# Patient Record
Sex: MALE | Race: WHITE | Employment: OTHER | URBAN - METROPOLITAN AREA
[De-identification: names, ages, dates, MRNs, and addresses within clinical notes are randomized per-mention and may not be internally consistent; named-entity substitution may affect disease eponyms.]

---

## 2019-08-11 ENCOUNTER — APPOINTMENT (OUTPATIENT)
Dept: CT IMAGING | Age: 74
DRG: 065 | End: 2019-08-11
Payer: MEDICARE

## 2019-08-11 ENCOUNTER — APPOINTMENT (OUTPATIENT)
Dept: GENERAL RADIOLOGY | Age: 74
DRG: 065 | End: 2019-08-11
Payer: MEDICARE

## 2019-08-11 ENCOUNTER — APPOINTMENT (OUTPATIENT)
Dept: MRI IMAGING | Age: 74
DRG: 065 | End: 2019-08-11
Payer: MEDICARE

## 2019-08-11 ENCOUNTER — HOSPITAL ENCOUNTER (INPATIENT)
Age: 74
LOS: 2 days | Discharge: HOME | DRG: 065 | End: 2019-08-13
Attending: EMERGENCY MEDICINE | Admitting: PSYCHIATRY & NEUROLOGY
Payer: MEDICARE

## 2019-08-11 DIAGNOSIS — I63.9 CEREBROVASCULAR ACCIDENT (CVA), UNSPECIFIED MECHANISM (HCC): Primary | ICD-10-CM

## 2019-08-11 PROBLEM — R29.90 STROKE-LIKE SYMPTOMS: Status: ACTIVE | Noted: 2019-08-11

## 2019-08-11 LAB
ABSOLUTE EOS #: 0.09 K/UL (ref 0–0.4)
ABSOLUTE IMMATURE GRANULOCYTE: NORMAL K/UL (ref 0–0.3)
ABSOLUTE LYMPH #: 1.07 K/UL (ref 1–4.8)
ABSOLUTE MONO #: 0.35 K/UL (ref 0.1–1.2)
ANION GAP SERPL CALCULATED.3IONS-SCNC: 8 MMOL/L (ref 9–17)
BASOPHILS # BLD: 0 % (ref 0–2)
BASOPHILS ABSOLUTE: 0.01 K/UL (ref 0–0.2)
BUN BLDV-MCNC: 30 MG/DL (ref 8–23)
BUN/CREAT BLD: ABNORMAL (ref 9–20)
CALCIUM SERPL-MCNC: 9.8 MG/DL (ref 8.6–10.4)
CHLORIDE BLD-SCNC: 104 MMOL/L (ref 98–107)
CO2: 28 MMOL/L (ref 20–31)
CREAT SERPL-MCNC: 1.09 MG/DL (ref 0.7–1.2)
DIFFERENTIAL TYPE: NORMAL
EOSINOPHILS RELATIVE PERCENT: 2 % (ref 1–4)
GFR AFRICAN AMERICAN: >60 ML/MIN
GFR NON-AFRICAN AMERICAN: >60 ML/MIN
GFR SERPL CREATININE-BSD FRML MDRD: ABNORMAL ML/MIN/{1.73_M2}
GFR SERPL CREATININE-BSD FRML MDRD: ABNORMAL ML/MIN/{1.73_M2}
GLUCOSE BLD-MCNC: 221 MG/DL (ref 70–99)
HCT VFR BLD CALC: 45.3 % (ref 41–53)
HEMOGLOBIN: 16.2 G/DL (ref 13.5–17.5)
IMMATURE GRANULOCYTES: NORMAL %
INR BLD: 1.1
LYMPHOCYTES # BLD: 26 % (ref 24–44)
MAGNESIUM: 2.2 MG/DL (ref 1.6–2.6)
MCH RBC QN AUTO: 30.2 PG (ref 26–34)
MCHC RBC AUTO-ENTMCNC: 35.8 G/DL (ref 31–37)
MCV RBC AUTO: 84.4 FL (ref 80–100)
MONOCYTES # BLD: 9 % (ref 2–11)
NRBC AUTOMATED: NORMAL PER 100 WBC
PARTIAL THROMBOPLASTIN TIME: 27.1 SEC (ref 21.3–31.3)
PDW BLD-RTO: 12.7 % (ref 12.5–15.4)
PLATELET # BLD: 142 K/UL (ref 140–450)
PLATELET ESTIMATE: NORMAL
PMV BLD AUTO: 9.4 FL (ref 8–14)
POTASSIUM SERPL-SCNC: 4.3 MMOL/L (ref 3.7–5.3)
PROTHROMBIN TIME: 11.6 SEC (ref 9.4–12.6)
RBC # BLD: 5.37 M/UL (ref 4.5–5.9)
RBC # BLD: NORMAL 10*6/UL
SEG NEUTROPHILS: 63 % (ref 36–66)
SEGMENTED NEUTROPHILS ABSOLUTE COUNT: 2.6 K/UL (ref 1.8–7.7)
SODIUM BLD-SCNC: 140 MMOL/L (ref 135–144)
TROPONIN INTERP: NORMAL
TROPONIN T: NORMAL NG/ML
TROPONIN, HIGH SENSITIVITY: 6 NG/L (ref 0–22)
WBC # BLD: 4.1 K/UL (ref 3.5–11)
WBC # BLD: NORMAL 10*3/UL

## 2019-08-11 PROCEDURE — 99449 NTRPROF PH1/NTRNET/EHR 31/>: CPT | Performed by: PSYCHIATRY & NEUROLOGY

## 2019-08-11 PROCEDURE — 6360000004 HC RX CONTRAST MEDICATION: Performed by: EMERGENCY MEDICINE

## 2019-08-11 PROCEDURE — 84484 ASSAY OF TROPONIN QUANT: CPT

## 2019-08-11 PROCEDURE — 85610 PROTHROMBIN TIME: CPT

## 2019-08-11 PROCEDURE — 83735 ASSAY OF MAGNESIUM: CPT

## 2019-08-11 PROCEDURE — 70551 MRI BRAIN STEM W/O DYE: CPT

## 2019-08-11 PROCEDURE — 71045 X-RAY EXAM CHEST 1 VIEW: CPT

## 2019-08-11 PROCEDURE — 99285 EMERGENCY DEPT VISIT HI MDM: CPT

## 2019-08-11 PROCEDURE — 80048 BASIC METABOLIC PNL TOTAL CA: CPT

## 2019-08-11 PROCEDURE — 36415 COLL VENOUS BLD VENIPUNCTURE: CPT

## 2019-08-11 PROCEDURE — 85025 COMPLETE CBC W/AUTO DIFF WBC: CPT

## 2019-08-11 PROCEDURE — 93005 ELECTROCARDIOGRAM TRACING: CPT | Performed by: EMERGENCY MEDICINE

## 2019-08-11 PROCEDURE — 6370000000 HC RX 637 (ALT 250 FOR IP): Performed by: EMERGENCY MEDICINE

## 2019-08-11 PROCEDURE — 2580000003 HC RX 258: Performed by: EMERGENCY MEDICINE

## 2019-08-11 PROCEDURE — 85730 THROMBOPLASTIN TIME PARTIAL: CPT

## 2019-08-11 PROCEDURE — 70450 CT HEAD/BRAIN W/O DYE: CPT

## 2019-08-11 PROCEDURE — 70498 CT ANGIOGRAPHY NECK: CPT

## 2019-08-11 PROCEDURE — 99223 1ST HOSP IP/OBS HIGH 75: CPT | Performed by: PSYCHIATRY & NEUROLOGY

## 2019-08-11 PROCEDURE — 2060000000 HC ICU INTERMEDIATE R&B

## 2019-08-11 RX ORDER — 0.9 % SODIUM CHLORIDE 0.9 %
80 INTRAVENOUS SOLUTION INTRAVENOUS ONCE
Status: COMPLETED | OUTPATIENT
Start: 2019-08-11 | End: 2019-08-11

## 2019-08-11 RX ORDER — 0.9 % SODIUM CHLORIDE 0.9 %
1000 INTRAVENOUS SOLUTION INTRAVENOUS ONCE
Status: COMPLETED | OUTPATIENT
Start: 2019-08-11 | End: 2019-08-11

## 2019-08-11 RX ORDER — LABETALOL 20 MG/4 ML (5 MG/ML) INTRAVENOUS SYRINGE
10 EVERY 4 HOURS PRN
Status: DISCONTINUED | OUTPATIENT
Start: 2019-08-11 | End: 2019-08-13 | Stop reason: HOSPADM

## 2019-08-11 RX ORDER — SODIUM CHLORIDE 0.9 % (FLUSH) 0.9 %
10 SYRINGE (ML) INJECTION EVERY 12 HOURS SCHEDULED
Status: DISCONTINUED | OUTPATIENT
Start: 2019-08-11 | End: 2019-08-13 | Stop reason: HOSPADM

## 2019-08-11 RX ORDER — ONDANSETRON 2 MG/ML
4 INJECTION INTRAMUSCULAR; INTRAVENOUS EVERY 6 HOURS PRN
Status: DISCONTINUED | OUTPATIENT
Start: 2019-08-11 | End: 2019-08-13 | Stop reason: HOSPADM

## 2019-08-11 RX ORDER — ASPIRIN 300 MG/1
300 SUPPOSITORY RECTAL DAILY
Status: DISCONTINUED | OUTPATIENT
Start: 2019-08-12 | End: 2019-08-13 | Stop reason: HOSPADM

## 2019-08-11 RX ORDER — SODIUM CHLORIDE 0.9 % (FLUSH) 0.9 %
10 SYRINGE (ML) INJECTION PRN
Status: DISCONTINUED | OUTPATIENT
Start: 2019-08-11 | End: 2019-08-13 | Stop reason: HOSPADM

## 2019-08-11 RX ORDER — ATORVASTATIN CALCIUM 80 MG/1
80 TABLET, FILM COATED ORAL NIGHTLY
Status: DISCONTINUED | OUTPATIENT
Start: 2019-08-11 | End: 2019-08-12

## 2019-08-11 RX ORDER — SODIUM CHLORIDE 0.9 % (FLUSH) 0.9 %
10 SYRINGE (ML) INJECTION PRN
Status: DISCONTINUED | OUTPATIENT
Start: 2019-08-11 | End: 2019-08-11 | Stop reason: SDUPTHER

## 2019-08-11 RX ORDER — ASPIRIN 81 MG/1
324 TABLET, CHEWABLE ORAL ONCE
Status: COMPLETED | OUTPATIENT
Start: 2019-08-11 | End: 2019-08-11

## 2019-08-11 RX ORDER — ASPIRIN 81 MG/1
81 TABLET ORAL DAILY
Status: DISCONTINUED | OUTPATIENT
Start: 2019-08-12 | End: 2019-08-13 | Stop reason: HOSPADM

## 2019-08-11 RX ORDER — CLOPIDOGREL BISULFATE 75 MG/1
300 TABLET ORAL ONCE
Status: COMPLETED | OUTPATIENT
Start: 2019-08-11 | End: 2019-08-11

## 2019-08-11 RX ORDER — SODIUM CHLORIDE 9 MG/ML
INJECTION, SOLUTION INTRAVENOUS CONTINUOUS
Status: DISCONTINUED | OUTPATIENT
Start: 2019-08-11 | End: 2019-08-13 | Stop reason: HOSPADM

## 2019-08-11 RX ORDER — CLOPIDOGREL BISULFATE 75 MG/1
75 TABLET ORAL DAILY
Status: DISCONTINUED | OUTPATIENT
Start: 2019-08-12 | End: 2019-08-13 | Stop reason: HOSPADM

## 2019-08-11 RX ADMIN — Medication 10 ML: at 12:16

## 2019-08-11 RX ADMIN — ASPIRIN 81 MG 324 MG: 81 TABLET ORAL at 12:34

## 2019-08-11 RX ADMIN — SODIUM CHLORIDE 80 ML: 9 INJECTION, SOLUTION INTRAVENOUS at 12:16

## 2019-08-11 RX ADMIN — CLOPIDOGREL BISULFATE 300 MG: 75 TABLET ORAL at 12:38

## 2019-08-11 RX ADMIN — SODIUM CHLORIDE 1000 ML: 9 INJECTION, SOLUTION INTRAVENOUS at 13:52

## 2019-08-11 RX ADMIN — IOVERSOL 75 ML: 741 INJECTION INTRA-ARTERIAL; INTRAVENOUS at 12:16

## 2019-08-11 SDOH — HEALTH STABILITY: MENTAL HEALTH: HOW OFTEN DO YOU HAVE A DRINK CONTAINING ALCOHOL?: NEVER

## 2019-08-11 ASSESSMENT — ENCOUNTER SYMPTOMS
WHEEZING: 0
CONSTIPATION: 0
SINUS PRESSURE: 0
STRIDOR: 0
NAUSEA: 0
BACK PAIN: 0
RHINORRHEA: 0
SHORTNESS OF BREATH: 0
TROUBLE SWALLOWING: 0
PHOTOPHOBIA: 0
VOICE CHANGE: 0
EYE REDNESS: 0

## 2019-08-11 ASSESSMENT — PAIN SCALES - GENERAL: PAINLEVEL_OUTOF10: 0

## 2019-08-11 NOTE — ED PROVIDER NOTES
[address] [facility] ED  [address] [facility]. [facility] [address]  Phone: [phone]  Fax: [phone]        Pt Name: Sudha Smiley  MRN: [id number]  [place] 1945  Date of evaluation: 8/11/19      CHIEF COMPLAINT     Chief Complaint   Patient presents with    Numbness     Right side of body is numb. Some extremity weakness as well. Last known well was 1130am today         HISTORY OF PRESENT ILLNESS  (Location/Symptom, Timing/Onset, Context/Setting, Quality, Duration, Modifying Factors, Severity.)    Sudha Smiley is a 69 y.o. male who presents with right sided numbness difficulty with coordination of the right side of the body and slurred speech. The patient states that he was pushing a boat when he suddenly started noticing some numbness to the right side of his body associated with some difficulty with coordination of his right arm right leg and is noted some difficulty with his speech he denies any headache no chest pain or shortness of breath denies any similar symptoms the incident occurred approximately 20 minutes prior to arrival nothing makes his symptoms better or worse      REVIEW OF SYSTEMS    (2-9 systems for level 4, 10 or more for level 5)     Review of Systems   Neurological: Positive for weakness and numbness. All other systems reviewed and are negative. PAST MEDICAL HISTORY    has no past medical history on file. SURGICAL HISTORY      has a past surgical history that includes Cholecystectomy and hernia repair. CURRENTMEDICATIONS       Previous Medications    No medications on file       ALLERGIES     is allergic to pcn [penicillins] and sulfa antibiotics. FAMILY HISTORY     He indicated that the status of his mother is unknown. He indicated that the status of his father is unknown.     family history includes Cancer in his father; High Blood Pressure in his mother; Stroke in his mother. SOCIAL HISTORY      reports that he has never smoked.  He does not Gaze:    Only horizontal eye movements will be tested. Voluntary or reflexive (oculocephalic) eye movements will be scored, but caloric testing is not done. If the patient has a conjugate deviation of the eyes that can be overcome by voluntary or reflexive activity, the score will be 1. If a patient has an isolated peripheral nerve paresis (CN III, IV or VI), score a 1. Gaze is testable in all aphasic patients. Patients with ocular trauma, bandages, pre-existing blindness, or other disorder of visual acuity or fields should be tested with reflexive movements, and a choice made by the investigator. Establishing eye contact and then moving about the patient from side to side will occasionally clarify the presence of a partial gaze palsy. 0 = Normal.   1 = Partial gaze palsy; gaze is abnormal in one or both eyes, but forced deviation or total gaze paresis is not present. 2 = Forced deviation, or total gaze paresis not overcome by the oculocephalic maneuver. 3.         0  Visual:  Visual fields (upper and lower quadrants) are tested by confrontation, using finger counting or visual threat, as appropriate. Patients may be encouraged, but if they look at the side of the moving fingers appropriately, this can be scored as normal. If there is unilateral blindness or enucleation, visual fields in the remaining eye are scored. Score 1 only if a clear-cut asymmetry, including quadrantanopia, is found. If patient is blind from any cause, score 3. Double simultaneous stimulation is performed at this point. If there is extinction, patient receives a 1, and the results are used to respond to item 11.     0 = No visual loss. 1 = Partial hemianopia. 2 = Complete hemianopia. 3 = Bilateral hemianopia (blind including cortical blindness). 4.       0 Facial Palsy:  Ask - or use pantomime to encourage - the patient to show teeth or raise eyebrows and close eyes.  Score symmetry of grimace in response to noxious

## 2019-08-11 NOTE — CONSULTS
smokeless tobacco history on file. He reports that he does not drink alcohol or use drugs. Family History   Problem Relation Age of Onset    Stroke Mother     High Blood Pressure Mother     Cancer Father        Current Medications:     sodium chloride flush  10 mL Intravenous 2 times per day    [START ON 8/12/2019] aspirin  81 mg Oral Daily    Or    [START ON 8/12/2019] aspirin  300 mg Rectal Daily    atorvastatin  80 mg Oral Nightly    [START ON 8/12/2019] clopidogrel  75 mg Oral Daily     PRN Meds include: sodium chloride flush, magnesium hydroxide, ondansetron, labetalol    ROS:   Constitutional Negative for fever and chills   HEENT Negative for ear discharge, ear pain, nosebleed   Eyes Negative for photophobia, pain and discharge   Respiratory Negative for hemoptysis and sputum   Cardiovascular Negative for orthopnea, claudication and PND   Gastrointestinal Negative for abdominal pain, diarrhea, blood in stool   Musculoskeletal Negative for joint pain, negative for myalgia   Skin Negative for rash or itching   Endo/heme/allergies Negative for polydipsia, environmental allergy   Psychiatric Negative for suicidal ideation. Patient is not anxious           Objective:   BP (!) 147/86   Pulse 67   Temp 98.2 °F (36.8 °C)   Resp 16   Ht 5' 6\" (1.676 m)   Wt 150 lb (68 kg)   SpO2 97%   BMI 24.21 kg/m²     Blood pressure range: Systolic (03SGN), NDA:578 , Min:131 , XNR:266   ; Diastolic (56YVC), HNO:24, Min:77, Max:91    On examination: Alert awake oriented x3. Speech fluent and coherent. On cranial nerve examination PERRLA and EOMI. Fundi reveal intact venous pulsations. Facial sensation reveals diminished light touch and pinprick in right face. Face appears symmetric. Tongue protrudes midline. Palate elevates symmetrically.   On motor examination he has subtle weakness of grade 5- in distal muscle groups of right upper and right lower extremities; otherwise 5/5 in left upper and left lower

## 2019-08-11 NOTE — ED NOTES
Dr Daniel Juarez completed telemed with patient and family. They are electing to have TPA given. Dr. Daniel Juarez advised Dr. Carolina Cloud of same. Dr. Carolina Cloud requests change to [facility] for transfer. Dr. Daniel Juarez requests patient to Neuro ICU direct if available or ED to ED if no ICU available.       Mando Sun RN  08/11/19 5653

## 2019-08-11 NOTE — ED PROVIDER NOTES
pulse is 68. His respiration is 18 and oxygen saturation is 97%.    69 y.o. male no acute distress, cardiac exam regular rate and rhythm no murmurs rubs gallops, pulmonary clear bilaterally abdomen soft nontender nondistended. Strength is 5 out of 5 in all 4 extremity's, there is mild pronator drift and mild decreased sensation on the right compared with the left, there is mild tongue deviation towards the right. No facial droop, pupils equal reactive bilaterally with no extraocular motion abnormality    Impression: CVA    Plan: Stroke alert based on timeframe, will need MRI and admission to neurology service      CRITICAL CARE: There was a high probability of clinically significant/life threatening deterioration in this patient's condition which required my urgent intervention. Total critical care time was 15 minutes. This excludes any time for separately reportable procedures.      Ivana Arcos MD  Attending Emergency Physician        Jonny Gray MD  08/11/19 8005       Jonny Gray MD  08/13/19 1013

## 2019-08-11 NOTE — CONSULTS
Endovascular Neurosurgery Note  Stroke Alert paged with ETA 1226  ER Room # 13  Present at beside on patient arrival ~1227  8/11/2019 2:41 PM    Pt Name: Carmen Dc  MRN: [id number]  YOB: 1945  Date of evaluation: 8/11/2019  Primary Care Physician: Adele Sheriff is a 69 y.o. male with no significant medical history who presented to [facility] ED as a transfer from [facility] due to concern for acute ischemic stroke. Onset of symptoms around 1130AM.  Patient states he has a boat in his driveway that he wanted to work on. He was trying to move it by himself earlier this morning when symptoms started. Reports acute onset of right sided numbness and weakness and difficulty getting his words out. [facility] NIH was reportedly 3. CT Head negative. CTA Head/Neck unremarkable. Risks and benefits of tPA were discussed and patient stated he did not want the medication. He was then loaded with Plavix 300mg and given ASA 324mg and transferred to [facility] ED. On arrival, NIH 4. Patient has very slight intermittent dysarthria and slight loss of fluency of speech. He feels that this has improved significantly. Also admits to right hemibody numbness, slight right upper extremity drift. Patient feels like he was educated on the risks and benefits of tPA. He feels like his symptoms have improved significantly and would still like to forgo tPA administration. Allergies  is allergic to pcn [penicillins] and sulfa antibiotics. Medications  Prior to Admission medications    Not on File    Scheduled Meds:   sodium chloride  1,000 mL Intravenous Once     Continuous Infusions:  PRN Meds:.sodium chloride flush    Past Medical History   has no past medical history on file.     OBJECTIVE  BP (!) 157/78   Pulse 68   Temp 98.2 °F (36.8 °C) (Oral)   Resp 18   Ht 5' 6\" (1.676 m)   Wt 150 lb (68 kg)   SpO2 97%   BMI 24.21 kg/m² ROS  CONSTITUTIONAL: negative for fatigue and malaise   EYES: negative for double vision and photophobia    HEENT: negative for tinnitus and sore throat   RESPIRATORY: negative for cough, shortness of breath   CARDIOVASCULAR: negative for chest pain, palpitations   GASTROINTESTINAL: negative for nausea, vomiting   GENITOURINARY: negative for incontinence   MUSCULOSKELETAL: negative for neck or back pain   NEUROLOGICAL: Positive right sided numbness, slight slurred speech/aphasia.  negative for seizures   PSYCHIATRIC: negative for agitated     Review of systems otherwise negative. EXAM:    CONSTITUTIONAL:  Well developed, well nourished, alert and oriented x 3, in no acute distress. GCS 15. Nontoxic. Slight intermittent dysarthria. Slight aphasia/loss of fluency of speech. HEAD:  normocephalic, atraumatic    EYES:  PERRL, EOMI.   ENT:  moist mucous membranes   NECK:  supple, symmetric   LUNGS:  Equal air entry bilaterally,clear   CARDIOVASCULAR:  normal s1 / s2, RRR   ABDOMEN:  Soft, no rigidity   NEUROLOGIC:  Mental Status:  A & O x3,awake             Cranial Nerves:    cranial nerves II-XII are grossly intact except right facial and tongue numbness. Motor Exam:    Drift:  present - slight drift right upper extremity, does not hit bed  Tone:  normal    Motor exam is 5 out of 5 all extremities with the exception of 4+/5 right upper extremity, slight drift    Sensory:    Touch:    Right Upper Extremity:  abnormal - numbness  Left Upper Extremity:  normal  Right Lower Extremity:  abnormal - numbness  Left Lower Extremity:  normal    Coordination/Dysmetria:  Heel to Shin:  Right:  normal  Left:  normal  Finger to Nose:   Right:  normal  Left:  normal    SKIN:  no rash        INITIAL NIH STROKE SCALE    Time Performed:  1227PM    Administer stroke scale items in the order listed. Record performance in each category after each subscale exam. Do not go back and change scores.  Follow directions provided for each exam technique. Scores should reflect what the patient does, not what the clinician thinks the patient can do. The clinician should record answers while administering the exam and work quickly. Except where indicated, the patient should not be coached (i.e., repeated requests to patient to make a special effort). 1a.  Level of consciousness:  0 - alert; keenly responsive  1b. Level of consciousness questions:  0 - answers both questions correctly  1c. Level of consciousness questions:  0 - performs both tasks correctly  2. Best Gaze:  0 - normal  3. Visual:  0 - no visual loss  4. Facial Palsy:  0 - normal symmetric movement  5a. Motor left arm:  0 - no drift, limb holds 90 (or 45) degrees for full 10 seconds  5b. Motor right arm:  1 - drift, limb holds 90 (or 45) degrees but drifts down before full 10 seconds: does not hit bed  6a. Motor left le - no drift; leg holds 30 degree position for full 5 seconds  6b. Motor right le - no drift; leg holds 30 degree position for full 5 seconds  7. Limb Ataxia:  0 - absent  8. Sensory:  1 - mild to moderate sensory loss; patient feels pinprick is less sharp or is dull on the affected side; there is a loss of superficial pain with pinprick but patient is aware of being touched   9. Best Language:  1 - mild to moderate aphasia; some obvious loss of fluency or facility of comprehension without significant limitation on ideas expressed or form of expression. Reduction of speech and/or comprehension, however, makes conversation about provided materials difficult or impossible. For example, in conversation about provided materials, examiner can identify picture or naming card content from patient's response. 10.  Dysarthria:  1 - mild to moderate, patient slurs at least some words and at worst, can be understood with some difficulty  11.   Extinction and Inattention:  0 - no abnormality    TOTAL:  4    Imaging:  CT brain without

## 2019-08-11 NOTE — ED PROVIDER NOTES
time. He appears well-developed and well-nourished. HENT:   Head: Normocephalic and atraumatic. Nose: Nose normal.   Mouth/Throat: Oropharynx is clear and moist.   Eyes: Pupils are equal, round, and reactive to light. EOM are normal.   Neck: Normal range of motion. Neck supple. Cardiovascular: Normal rate, regular rhythm, normal heart sounds and intact distal pulses. Pulmonary/Chest: Breath sounds normal. No respiratory distress. He has no wheezes. He has no rales. Abdominal: Soft. Bowel sounds are normal. He exhibits no distension. There is no tenderness. Musculoskeletal: Normal range of motion. He exhibits no edema or deformity. Neurological: He is alert and oriented to person, place, and time. He has normal reflexes. Right lower extremity diminished sensation. Right upper extremity diminished sensation. Right upper extremity drift that does not hit the bed. No facial droop. Pupils equal round reactive. Patient with mild dysarthria. Skin: Skin is warm and dry. No rash noted. No erythema. Psychiatric: He has a normal mood and affect. His behavior is normal.       DIFFERENTIAL  DIAGNOSIS     PLAN (LABS / IMAGING / EKG):  Orders Placed This Encounter   Procedures    CT Head WO Contrast    CTA HEAD NECK W CONTRAST    XR CHEST PORTABLE    MRI BRAIN WO CONTRAST    Protime-INR    APTT    CBC Auto Differential    Basic Metabolic Panel    Troponin    Magnesium    Hemoglobin A1c    Lipid panel - fasting    CBC    Basic Metabolic Panel w/ Reflex to MG    DIET GENERAL;    Telemetry monitoring    Vital signs    Telemetry monitoring    Up as tolerated    Adv Diet as Sherin (nurse communication)    NIHSS/Neuro Checks    Swallow screen by nursing before diet and oral medications started.     Stroke education    Misc nursing order (specify)    Full Code    Inpatient consult to Stroke Team    Inpatient consult to PM&R - Physiatry    OT eval and treat    PT evaluation and treat    Initiate Oxygen Therapy Protocol    Speech Language Pathology (SLP) eval and treat    EKG 12 Lead    EKG REPORT    Echocardiogram complete 2D with doppler with color    Insert peripheral IV    Saline lock IV    PATIENT STATUS (FROM ED OR OR/PROCEDURAL) Inpatient    PATIENT STATUS (FROM ED OR OR/PROCEDURAL) Inpatient       MEDICATIONS ORDERED:  Orders Placed This Encounter   Medications    ioversol (OPTIRAY) 74 % injection 75 mL    0.9 % sodium chloride bolus    sodium chloride flush 0.9 % injection 10 mL    aspirin chewable tablet 324 mg    clopidogrel (PLAVIX) tablet 300 mg    0.9 % sodium chloride bolus    sodium chloride flush 0.9 % injection 10 mL    DISCONTD: sodium chloride flush 0.9 % injection 10 mL    magnesium hydroxide (MILK OF MAGNESIA) 400 MG/5ML suspension 30 mL    ondansetron (ZOFRAN) injection 4 mg    OR Linked Order Group     aspirin EC tablet 81 mg     aspirin suppository 300 mg    0.9 % sodium chloride infusion    DISCONTD: atorvastatin (LIPITOR) tablet 80 mg    clopidogrel (PLAVIX) tablet 75 mg    labetalol (NORMODYNE;TRANDATE) injection syringe 10 mg    rosuvastatin (CRESTOR) tablet 10 mg       DDX: CVA, MS, Guillain [place], Transverse myelitis, Myasthenia gravis, cardiac, anemia, electrolytes, infection, change in medications, hypothyroid, rheumatalgic, depression, dehydration    Evaluate for: orthostatic symptoms, conjunctiva pallor, PERRLA, EOMI, lung crackles, neuro exam, reflexes, guaiac stool      DIAGNOSTIC RESULTS / EMERGENCY DEPARTMENT COURSE / MDM     LABS:  Results for orders placed or performed during the hospital encounter of 08/11/19   Protime-INR   Result Value Ref Range    Protime 11.6 9.4 - 12.6 sec    INR 1.1    APTT   Result Value Ref Range    PTT 27.1 21.3 - 31.3 sec   CBC Auto Differential   Result Value Ref Range    WBC 4.1 3.5 - 11.0 k/uL    RBC 5.37 4.5 - 5.9 m/uL    Hemoglobin 16.2 13.5 - 17.5 g/dL    Hematocrit 45.3 41 - 53 %    MCV 84.4 80 - 44.6 40.7 - 50.3 %    MCV 86.3 82.6 - 102.9 fL    MCH 29.8 25.2 - 33.5 pg    MCHC 34.5 28.4 - 34.8 g/dL    RDW 12.1 11.8 - 14.4 %    Platelets 604 878 - 895 k/uL    MPV 9.1 8.1 - 13.5 fL    NRBC Automated 0.0 0.0 per 100 WBC   Basic Metabolic Panel w/ Reflex to MG   Result Value Ref Range    Glucose 134 (H) 70 - 99 mg/dL    BUN 23 8 - 23 mg/dL    CREATININE 0.86 0.70 - 1.20 mg/dL    Bun/Cre Ratio NOT REPORTED 9 - 20    Calcium 8.9 8.6 - 10.4 mg/dL    Sodium 140 135 - 144 mmol/L    Potassium 3.9 3.7 - 5.3 mmol/L    Chloride 104 98 - 107 mmol/L    CO2 25 20 - 31 mmol/L    Anion Gap 11 9 - 17 mmol/L    GFR Non-African American >60 >60 mL/min    GFR African American >60 >60 mL/min    GFR Comment          GFR Staging NOT REPORTED    EKG 12 Lead   Result Value Ref Range    Ventricular Rate 72 BPM    Atrial Rate 72 BPM    P-R Interval 148 ms    QRS Duration 80 ms    Q-T Interval 368 ms    QTc Calculation (Bazett) 402 ms    P Axis 52 degrees    R Axis -58 degrees    T Axis 46 degrees         RADIOLOGY:  None    EKG  None    All EKG's are interpreted by the Emergency Department Physician who either signs or Co-signs this chart in the absence of a cardiologist.    EMERGENCY DEPARTMENT COURSE:  Patient with a NIH of 4 on arrival.  Patient with mild improvement in symptoms. Explained risks and benefits of TPA and patient declining TPA. Neuro team at bedside evaluating patient. Patient will be admitted to the neuro ICU. Patient denying any pain. Patient awaiting bed. PROCEDURES:  None    CONSULTS:  IP CONSULT TO STROKE TEAM  IP CONSULT TO PHYSICAL MEDICINE REHAB    CRITICAL CARE:  None    FINAL IMPRESSION      1.  Cerebrovascular accident (CVA), unspecified mechanism ([facility] [address]          DISPOSITION / PLAN     DISPOSITION Admitted 08/11/2019 02:55:42 PM      PATIENT REFERRED TO:  [address] [facility], [address]  [address]  [phone]            DISCHARGE MEDICATIONS:  There are no discharge medications for this patient.       Brielle Hays MD  Emergency Medicine Resident    (Please note that portions of thisnote were completed with a voice recognition program.  Efforts were made to edit the dictations but occasionally words are mis-transcribed.)       Brielle Hays MD  08/12/19 8545

## 2019-08-11 NOTE — ED PROVIDER NOTES
Patient presented with numbness to outside facility within the window for TPA however declined it at the time patient is currently admitted to neuro for stroke evaluation      Sage George DO  08/11/19 [address] Barbara Maciel DO  08/11/19 0034

## 2019-08-11 NOTE — CONSULTS
at patient bedside (via interactive/real-time software) as patient is in imminent and life threatening deterioration without further treatment and evaluation.   Time spent examining patient, reviewing the images personally, reviewing the chart, perform high complexity decision making and speaking with the nursing staff regarding recommendations     Geri Solorio MD Pager: [phone]  Stroke, [facility]  [address] [address] [place]  Electronically signed 8/11/2019 at 1:58 PM

## 2019-08-11 NOTE — ED NOTES
Pt arrives to ER tx from [facility] er c/o right sided numbness sudden onset this morning appx 1130 LKW. Pt received 324 ASA and dose of plavix PTA at previous facility, denied TPA. Dr Ivy Hernandez on board and has spoken w/ pt. Pt transferred for stroke team consult. Pt arrives w/ mild numbness to entire right side, states symptoms have improved dramatically. Pt states he initially had more severe numbness along w/ expressive aphasia, the expressive aphasia has now resolved. Pt in NAD w/ rr even and unalbored, aox4, and denies pain on arrival. Stroke and ER teams @ bedside for eval. Pt on telemetry, call light within reach. Will continue to monitor.      Ailyn Dang RN  08/11/19 2612

## 2019-08-12 LAB
ANION GAP SERPL CALCULATED.3IONS-SCNC: 11 MMOL/L (ref 9–17)
BUN BLDV-MCNC: 23 MG/DL (ref 8–23)
BUN/CREAT BLD: ABNORMAL (ref 9–20)
CALCIUM SERPL-MCNC: 8.9 MG/DL (ref 8.6–10.4)
CHLORIDE BLD-SCNC: 104 MMOL/L (ref 98–107)
CHOLESTEROL/HDL RATIO: 4.3
CHOLESTEROL: 187 MG/DL
CO2: 25 MMOL/L (ref 20–31)
CREAT SERPL-MCNC: 0.86 MG/DL (ref 0.7–1.2)
EKG ATRIAL RATE: 72 BPM
EKG P AXIS: 52 DEGREES
EKG P-R INTERVAL: 148 MS
EKG Q-T INTERVAL: 368 MS
EKG QRS DURATION: 80 MS
EKG QTC CALCULATION (BAZETT): 402 MS
EKG R AXIS: -58 DEGREES
EKG T AXIS: 46 DEGREES
EKG VENTRICULAR RATE: 72 BPM
ESTIMATED AVERAGE GLUCOSE: 140 MG/DL
GFR AFRICAN AMERICAN: >60 ML/MIN
GFR NON-AFRICAN AMERICAN: >60 ML/MIN
GFR SERPL CREATININE-BSD FRML MDRD: ABNORMAL ML/MIN/{1.73_M2}
GFR SERPL CREATININE-BSD FRML MDRD: ABNORMAL ML/MIN/{1.73_M2}
GLUCOSE BLD-MCNC: 134 MG/DL (ref 70–99)
HBA1C MFR BLD: 6.5 % (ref 4–6)
HCT VFR BLD CALC: 44.6 % (ref 40.7–50.3)
HDLC SERPL-MCNC: 43 MG/DL
HEMOGLOBIN: 15.4 G/DL (ref 13–17)
LDL CHOLESTEROL: 123 MG/DL (ref 0–130)
LV EF: 53 %
LVEF MODALITY: NORMAL
MCH RBC QN AUTO: 29.8 PG (ref 25.2–33.5)
MCHC RBC AUTO-ENTMCNC: 34.5 G/DL (ref 28.4–34.8)
MCV RBC AUTO: 86.3 FL (ref 82.6–102.9)
NRBC AUTOMATED: 0 PER 100 WBC
PDW BLD-RTO: 12.1 % (ref 11.8–14.4)
PLATELET # BLD: 143 K/UL (ref 138–453)
PMV BLD AUTO: 9.1 FL (ref 8.1–13.5)
POTASSIUM SERPL-SCNC: 3.9 MMOL/L (ref 3.7–5.3)
RBC # BLD: 5.17 M/UL (ref 4.21–5.77)
SODIUM BLD-SCNC: 140 MMOL/L (ref 135–144)
TRIGL SERPL-MCNC: 107 MG/DL
VLDLC SERPL CALC-MCNC: NORMAL MG/DL (ref 1–30)
WBC # BLD: 5 K/UL (ref 3.5–11.3)

## 2019-08-12 PROCEDURE — 99232 SBSQ HOSP IP/OBS MODERATE 35: CPT | Performed by: PSYCHIATRY & NEUROLOGY

## 2019-08-12 PROCEDURE — 80061 LIPID PANEL: CPT

## 2019-08-12 PROCEDURE — 85027 COMPLETE CBC AUTOMATED: CPT

## 2019-08-12 PROCEDURE — 92523 SPEECH SOUND LANG COMPREHEN: CPT

## 2019-08-12 PROCEDURE — 83036 HEMOGLOBIN GLYCOSYLATED A1C: CPT

## 2019-08-12 PROCEDURE — 80048 BASIC METABOLIC PNL TOTAL CA: CPT

## 2019-08-12 PROCEDURE — 97530 THERAPEUTIC ACTIVITIES: CPT

## 2019-08-12 PROCEDURE — 36415 COLL VENOUS BLD VENIPUNCTURE: CPT

## 2019-08-12 PROCEDURE — 2060000000 HC ICU INTERMEDIATE R&B

## 2019-08-12 PROCEDURE — 6370000000 HC RX 637 (ALT 250 FOR IP): Performed by: NURSE PRACTITIONER

## 2019-08-12 PROCEDURE — 97535 SELF CARE MNGMENT TRAINING: CPT

## 2019-08-12 PROCEDURE — 97162 PT EVAL MOD COMPLEX 30 MIN: CPT

## 2019-08-12 PROCEDURE — 2580000003 HC RX 258: Performed by: NURSE PRACTITIONER

## 2019-08-12 PROCEDURE — 6370000000 HC RX 637 (ALT 250 FOR IP): Performed by: STUDENT IN AN ORGANIZED HEALTH CARE EDUCATION/TRAINING PROGRAM

## 2019-08-12 PROCEDURE — 97166 OT EVAL MOD COMPLEX 45 MIN: CPT

## 2019-08-12 PROCEDURE — 93306 TTE W/DOPPLER COMPLETE: CPT

## 2019-08-12 RX ORDER — ROSUVASTATIN CALCIUM 10 MG/1
10 TABLET, COATED ORAL NIGHTLY
Status: DISCONTINUED | OUTPATIENT
Start: 2019-08-12 | End: 2019-08-13 | Stop reason: HOSPADM

## 2019-08-12 RX ADMIN — Medication 10 ML: at 09:13

## 2019-08-12 RX ADMIN — ASPIRIN 81 MG: 81 TABLET ORAL at 09:13

## 2019-08-12 RX ADMIN — ROSUVASTATIN CALCIUM 10 MG: 10 TABLET, COATED ORAL at 16:40

## 2019-08-12 RX ADMIN — CLOPIDOGREL 75 MG: 75 TABLET, FILM COATED ORAL at 09:13

## 2019-08-12 ASSESSMENT — ENCOUNTER SYMPTOMS
BACK PAIN: 0
NAUSEA: 0
VOMITING: 0
COUGH: 0
SHORTNESS OF BREATH: 0
SORE THROAT: 0
CHEST TIGHTNESS: 0
ABDOMINAL PAIN: 0
WHEEZING: 0
PHOTOPHOBIA: 0
TROUBLE SWALLOWING: 0

## 2019-08-12 ASSESSMENT — PAIN - FUNCTIONAL ASSESSMENT: PAIN_FUNCTIONAL_ASSESSMENT: 0-10

## 2019-08-12 ASSESSMENT — PAIN SCALES - GENERAL
PAINLEVEL_OUTOF10: 0

## 2019-08-12 NOTE — PROGRESS NOTES
PATIENT HEALTH QUESTIONNAIRE-9                                            (PHQ-9)    Over the past 2 weeks, how often have you been bothered by any of the following problems?     -------------------------------------------------------------------------------------------------------------------  1. Little interest or pleasure in doing things   0x 1 2 3  -------------------------------------------------------------------------------------------------------------------  2. Feeling down, depressed or hopeless   0x 1 2 3  -------------------------------------------------------------------------------------------------------------------  3. Trouble falling or staying asleep, or sleeping too much  0x 1 2 3  -------------------------------------------------------------------------------------------------------------------  4. Feeling tired or having little energy   0 1x 2 3  -------------------------------------------------------------------------------------------------------------------  5. Poor appetite or overeating    0x 1 2 3  -------------------------------------------------------------------------------------------------------------------  6. Feeling bad about yourself--or that you are      A failure or have let yourself or family down. 0x 1 2 3   ------------------------------------------------------------------------------------------------------------------  7. Trouble concentrating on things, such as reading        the newspaper or watching T.V.     0x 1 2 3  ------------------------------------------------------------------------------------------------------------------  8. Moving or speaking so slowly that other people could noticed?         Or the opposite-- being so fidgety or restless that you have been  0x 1 2 3      moving around a lot more than

## 2019-08-12 NOTE — PROGRESS NOTES
Subjective  General  Patient assessed for rehabilitation services?: Yes  Response To Previous Treatment: Not applicable  Family / Caregiver Present: No  Follows Commands: Within Functional Limits  Subjective  Subjective: Pt and RN agreeable to PT. Pt laying in bed upon arrival. Pt pleasant and cooperative. Pain Screening  Patient Currently in Pain: No  Pain Assessment  Pain Assessment: 0-10  Pain Level: 0  Vital Signs  Patient Currently in Pain: No     Social/Functional History  Social/Functional History  Lives With: Spouse(During week they live apart due to commute for work. She is in [facility], [address] works in [employer] [facility])  Home Layout: [address] [place] [address] Access: Stairs to enter without rails  Entrance Stairs - Number of Steps: 2  Bathroom Shower/Tub: Walk-in shower  Bathroom Toilet: Standard  ADL Assistance: [address]: Independent  Homemaking Responsibilities: Yes  Ambulation Assistance: Independent  Transfer Assistance: Independent  Active : Yes  Mode of Transportation: Car  Occupation: Full time employment  Type of occupation: Advertising, insurance, rental properties which require manual labor  Leisure & Hobbies: boating  Additional Comments: Wife works full time as well.   Cognition   Cognition  Overall Cognitive Status: WFL    Objective  Joint Mobility  ROM RLE: WFL  ROM LLE: WFL  ROM RUE: WFL  ROM LUE: WFL  Strength RLE  Strength RLE: WFL  Comment: Decreased coordination with movements to MMT  Strength LLE  Strength LLE: WFL  Strength RUE  Strength RUE: WFL  Comment: Decreased coordination with movements to MMT  Strength LUE  Strength LUE: WFL  Tone RLE  RLE Tone: Normotonic  Tone LLE  LLE Tone: Normotonic  Motor Control  Gross Motor?: WFL  Coordination  Finger to Nose: Dysmetric  Sensation  Overall Sensation Status: Impaired(Pt reports numbness on entire R side of body)  Bed mobility  Supine to Sit: Stand by assistance  Scooting: Stand by assistance  Comment: Pt laying in bed upon

## 2019-08-12 NOTE — PROGRESS NOTES
Neurology Resident Progress Note      SUBJECTIVE:  This is a 69 y.o.  male admitted 8/11/2019 for Stroke-like symptoms [R29.90]  Stroke with cerebral ischemia [facility]) [I63.9]  This is a follow-up neurology progress note. The patient was seen and examined and the chart was reviewed. There were no acute events overnight. Mild improvement in right-sided numbness and weakness. HPI    The patient is a 69 y.o.  male who presented with sudden onset right facial tingling and numbness that progressed quickly down to his right arm and to his right leg, associated with some right-sided numbness and clumsiness. Happened at [phone] when he was pushing his boat, also felt like he was slurring his words. He called his wife who drove him [place] [facility]. Denies falling, headache, nausea vomiting, dizziness, vertigo, loss of consciousness, chest pain, palpitation. No prior history of stroke. Not on any medications.     In ED, CT head and CTA were unremarkable, stroke alert was called, TPA was withheld due to improving symptoms, aspirin Plavix were loaded. Patient was transferred to [facility] for further work-up  In the ED and [facility], MRI done, showing left thalamic lacunar stroke.     Patient is admitted to the hospital for acute cerebrovascular accident.  sodium chloride flush  10 mL Intravenous 2 times per day    aspirin  81 mg Oral Daily    Or    aspirin  300 mg Rectal Daily    atorvastatin  80 mg Oral Nightly    clopidogrel  75 mg Oral Daily       History reviewed. No pertinent past medical history. Past Surgical History:   Procedure Laterality Date    CHOLECYSTECTOMY      HERNIA REPAIR          PHYSICAL EXAM:      Blood pressure 134/73, pulse 56, temperature 97.8 °F (36.6 °C), temperature source Oral, resp. rate 10, height 5' 6\" (1.676 m), weight 150 lb (68 kg), SpO2 96 %.      General Examination    General Awake   Head Normocephalic, without obvious abnormality, atraumatic   Neck focal stenosis. POSTERIOR CIRCULATION: The posterior cerebral arteries demonstrate no focal stenosis. The vertebral and basilar arteries appear unremarkable. BRAIN: No mass effect or midline shift. No abnormal extra-axial fluid collection. The gray-white differentiation appears grossly maintained.      Unremarkable CTA of the head and neck.      Mri Brain Wo Contrast     Result Date: 8/11/2019  EXAMINATION: MRI OF THE BRAIN WITHOUT CONTRAST  8/11/2019 4:11 pm TECHNIQUE: Multiplanar multisequence MRI of the brain was performed without the administration of intravenous contrast. COMPARISON: None. HISTORY: ORDERING SYSTEM PROVIDED HISTORY: eval for stroke FINDINGS: INTRACRANIAL STRUCTURES/VENTRICLES: There is an acute lacunar infarct within the left thalamus. No mass, shift, or bleed is identified. There is mild chronic white matter microvascular ischemic disease characterized by periventricular white matter signal abnormality. ORBITS: The visualized portion of the orbits demonstrate no acute abnormality. SINUSES: The visualized paranasal sinuses and mastoid air cells are well aerated. BONES/SOFT TISSUES: The bone marrow signal intensity appears normal. The soft tissues demonstrate no acute abnormality.      Acute lacunar infarct within the left thalamus. The findings were sent to the Radiology Results [address] at 4:38 pm on 8/11/2019to be communicated to a licensed caregiver.          Assessment and Plan:    69 y.o.  male with acute left thalamic stroke. Improvement in symptoms, residual right mary ann sensory deficit, NIHSS 1     Permissive hypertension, Loaded with apsirin and plavix on admission, will continue aspirin 81 and plavix 75 for 21 days, followed by aspirin 81. Passed swallow, , hgba1c and echo pending    History of intolerance to lipitor, will switch to crestor.        DVT prophylaxis: Lovenox 40 mg SC        Consultations:   Consults:   PT/OT     Nursing:  Vital signs per unit

## 2019-08-13 VITALS
WEIGHT: 150 LBS | DIASTOLIC BLOOD PRESSURE: 75 MMHG | BODY MASS INDEX: 24.11 KG/M2 | HEART RATE: 73 BPM | TEMPERATURE: 97.3 F | SYSTOLIC BLOOD PRESSURE: 127 MMHG | HEIGHT: 66 IN | RESPIRATION RATE: 12 BRPM | OXYGEN SATURATION: 98 %

## 2019-08-13 PROCEDURE — 97535 SELF CARE MNGMENT TRAINING: CPT

## 2019-08-13 PROCEDURE — 99232 SBSQ HOSP IP/OBS MODERATE 35: CPT | Performed by: PSYCHIATRY & NEUROLOGY

## 2019-08-13 PROCEDURE — 6370000000 HC RX 637 (ALT 250 FOR IP): Performed by: NURSE PRACTITIONER

## 2019-08-13 PROCEDURE — 99221 1ST HOSP IP/OBS SF/LOW 40: CPT | Performed by: PHYSICAL MEDICINE & REHABILITATION

## 2019-08-13 PROCEDURE — 97116 GAIT TRAINING THERAPY: CPT

## 2019-08-13 PROCEDURE — 2580000003 HC RX 258: Performed by: NURSE PRACTITIONER

## 2019-08-13 PROCEDURE — 97112 NEUROMUSCULAR REEDUCATION: CPT

## 2019-08-13 RX ORDER — ASPIRIN 81 MG/1
81 TABLET ORAL DAILY
Qty: 30 TABLET | Refills: 3 | Status: SHIPPED | OUTPATIENT
Start: 2019-08-14 | End: 2019-12-11 | Stop reason: SDUPTHER

## 2019-08-13 RX ORDER — CLOPIDOGREL BISULFATE 75 MG/1
75 TABLET ORAL DAILY
Qty: 20 TABLET | Refills: 0 | Status: SHIPPED | OUTPATIENT
Start: 2019-08-14 | End: 2020-12-16 | Stop reason: ALTCHOICE

## 2019-08-13 RX ORDER — ROSUVASTATIN CALCIUM 10 MG/1
10 TABLET, COATED ORAL NIGHTLY
Qty: 30 TABLET | Refills: 3 | Status: SHIPPED | OUTPATIENT
Start: 2019-08-13 | End: 2019-12-11 | Stop reason: SDUPTHER

## 2019-08-13 RX ADMIN — CLOPIDOGREL 75 MG: 75 TABLET, FILM COATED ORAL at 11:02

## 2019-08-13 RX ADMIN — Medication 10 ML: at 11:02

## 2019-08-13 RX ADMIN — ASPIRIN 81 MG: 81 TABLET ORAL at 11:02

## 2019-08-13 NOTE — PROGRESS NOTES
Active problem left thalamic infarction . The condition is he walked 250 feet with no device in PT . PMR feel that he can go home with OTP PT/OT . He is alert and oriented . There is normal strength except mild decrease right fine motor with dysmetria and right finger to nose and shin to heel with decrease sensation right face right arm and right leg . CTA head and neck with no large vessel occlusion . Cholesterol 187 ,  ,  , Hga1c 6.5 . Cardiac 2 D echo normal LVF , EF 50-55 % . Mild LVH . Significant medications ASA 81 mg po qd , plavix 75 mg po qd x 3 weeks then aspirin 81 mg po qd  , crestor 10 mg po qd      History reviewed. No pertinent past medical history.     Past Surgical History:   Procedure Laterality Date    CHOLECYSTECTOMY      HERNIA REPAIR         Family History   Problem Relation Age of Onset    Stroke Mother     High Blood Pressure Mother     Cancer Father        Social History     Socioeconomic History    Marital status:      Spouse name: None    Number of children: None    Years of education: None    Highest education level: None   Occupational History    None   Social Needs    Financial resource strain: None    Food insecurity:     Worry: None     Inability: None    Transportation needs:     Medical: None     Non-medical: None   Tobacco Use    Smoking status: Never Smoker   Substance and Sexual Activity    Alcohol use: Never     Frequency: Never    Drug use: Never    Sexual activity: None   Lifestyle    Physical activity:     Days per week: None     Minutes per session: None    Stress: None   Relationships    Social connections:     Talks on phone: None     Gets together: None     Attends Sikhism service: None     Active member of club or organization: None     Attends meetings of clubs or organizations: None     Relationship status: None    Intimate partner violence:     Fear of current or ex partner: None     Emotionally abused: None     Physically Negative for joint pain, negative for myalgia  Skin                                 Negative for rash or itching  Endo/heme/allergies       Negative for polydipsia, environmental allergy  Psychiatric                       Negative for suicidal ideation. Patient is not anxious    Vitals:    08/13/19 1055   BP: 110/81   Pulse: 76   Resp: 19   Temp: 97.5 °F (36.4 °C)   SpO2: 100%     Admission weight: 150 lb (68 kg)    Neurological Examination  Constitutional .General exam well groomed   Head/ Ears /Nose/Throat/external ear . Normal exam  Neck and thyroid . Normal size. No bruits  Respiratory . Breathsounds clear bilaterally  Cardiovascular: Auscultation of heart with regular rate and rhythm   Musculoskeletal. Muscle bulk and tone normal                                                           Muscle strength 5/5 strength throughout                                                                                Dysmetria right finger to nose and shin to heel   No tremor   Mild decrease right fine motor  Orientation Alert and oriented x 3   Attention and concentration normal  Short term memory normal  Language process and speech normal . No aphasia   Cranial nerve 2 normal acuety and visual fields  Cranial nerve 3, 4 and 6 . Extraocular muscles are intact . Pupils are equal and reactive   Cranial nerve 5 . Intact corneal reflex. Decrease sensation facial sensation  Cranial nerve 7 normal exam   Cranial nerve 8. Grossly intact hearing   Cranial nerve 9 and 10. Symmetric palate elevation   Cranial nerve 11 , 5 out of 5 strength   Cranial Nerve 12 midline tongue . No atrophy  Sensation . Decrease sensation to pin prick and light touch right arm and right leg   Deep Tendon Reflexes brisker on right   Plantar response flexor bilaterally    Assessment :    Left thalamic infarction     Plan:    Discharge on ASA 81 mg po qd , plavix 75 mg po qd x 3 weeks then aspirin 81 mg po qd  , crestor 10 mg po qd. OTP PT/OT .  MELINDA Spann

## 2019-08-13 NOTE — PROGRESS NOTES
Occupational Therapy  Facility/Department: [facility] NEURO  Daily Treatment Note  NAME: Alexander Tong  :   MRN: [id number]    Date of Service: 2019    Discharge Recommendations:Further therapy recommended at discharge. Assessment   Performance deficits / Impairments: Decreased strength;Decreased high-level IADLs  Prognosis: Good  Ed on OT services, ADLs, EC/WS, DME/AE, home safety, fall prevention tips- good return  REQUIRES OT FOLLOW UP: Yes  Activity Tolerance  Activity Tolerance: Patient Tolerated treatment well  Safety Devices  Safety Devices in place: Yes  Type of devices: All fall risk precautions in place;Call light within reach; Chair alarm in place; Left in chair;Nurse notified         Patient Diagnosis(es): The encounter diagnosis was Cerebrovascular accident (CVA), unspecified mechanism ([facility] [address] has no past medical history on file. has a past surgical history that includes Cholecystectomy and hernia repair. Restrictions  Restrictions/Precautions  Restrictions/Precautions: Up as Tolerated, General Precautions  Required Braces or Orthoses?: No    Subjective   General  Patient assessed for rehabilitation services?: Yes  Family / Caregiver Present: No  Diagnosis: R numbness/Incoordination  Vital Signs  Patient Currently in Pain: No   Orientation  Orientation  Overall Orientation Status: Within Functional Limits  Objective    Pt still demo R UE weakness/numbness but pt still attempting to use functionally during ADLs/transfers noted. Pt at times did drop items w/ R hand. NO LOB when demo functional transfers/mob in room to reach/retieve grooming supplies. Pt retired to chair at end of session and dressed in personal clothes.    ADL  Grooming: Increased time to complete;Supervision  UE Bathing: Increased time to complete;Supervision  LE Bathing: Increased time to complete;Supervision  UE Dressing: Increased time to complete;Supervision  LE Dressing: Increased time to

## 2019-08-13 NOTE — CONSULTS
Father            Physical Exam:    /81   Pulse 76   Temp 97.5 °F (36.4 °C) (Oral)   Resp 19   Ht 5' 6\" (1.676 m)   Wt 150 lb (68 kg)   SpO2 100%   BMI 24.21 kg/m²     General appearance: alert, appears stated age, cooperative, and no distress  Head: Normocephalic, without obvious abnormality, atraumatic  Eyes: conjunctivae clear. Throat: lips, mucosa, and tongue normal.  Neck: no adenopathy and supple, symmetrical, trachea midline. Lungs: clear to auscultation bilaterally. Heart: regular rate and rhythm, no murmur. Abdomen: soft, non-tender; bowel sounds normal.  Extremities: extremities normal, atraumatic, no edema, normal tone. Mental status: Alert, orientedX3, thought content appropriate. Sensory: Intact in BUE and BLE to soft and pin sensation. Motor: Muscle tone and bulk are normal bilaterally. No pronator drift. Left Shoulder- abd-  5/5 Elbow Flex/Ext-  5/5 / 5/5 Hand -  normal   Right Shoulder-abd-   5/5 Elbow Flex/Ext-  5/5 / 5/5 Hand -  normal     Left Hip Flex-  5/5 Knee Flex/ Ext-  5/5 / 5/5 Ankle DF/PF-  5/5 / 5/5   Right Hip Flex-  5/5  Knee Flex/Ext-  5/5 / 5/5 Ankle DF/PF- 5/5 / 5/5     Reflexes:  MSR Biceps Brachiorad. Triceps Patellar   Left    2+    2+    2+    2+   Right    2+    2+    2+    2+   Plantar reflex is down going bilaterally. Coordination: finger to nose normal bilaterally.       Diagnostics:  CBC   Lab Results   Component Value Date    WBC 5.0 08/12/2019    RBC 5.17 08/12/2019    HGB 15.4 08/12/2019    HCT 44.6 08/12/2019    MCV 86.3 08/12/2019    RDW 12.1 08/12/2019     08/12/2019     BMP    Lab Results   Component Value Date     08/12/2019    K 3.9 08/12/2019     08/12/2019    CO2 25 08/12/2019    BUN 23 08/12/2019     Uric Acid  No components found for: URIC  VITAMIN B12 No components found for: B12  PT/INR  No results found for: PTINR    Radiology:     Impression: Mr. Nori Gonzalez is a 69 y.o. left handed male with a history

## 2019-08-13 NOTE — PROGRESS NOTES
requiring use of the bedrails. Transfers  Sit to Stand: Independent  Stand to sit: Independent  Bed to Chair: Independent  Comment: STS x6  Ambulation  Ambulation?: Yes  Ambulation 1  Surface: level tile  Device: No Device  Assistance: Stand by assistance  Quality of Gait: Steady jumana   Distance: 250ft   Comments: Pt demos good balance while amb with no LOB or AD required. Stairs/Curb  Stairs?: Yes  Stairs  # Steps : 4  Stairs Height: 6\"  Rails: Right ascending  Device: No Device  Assistance: Contact guard assistance  Comment: CGA utilized to maintain stability. Balance  Posture: Good  Sitting - Static: Good  Sitting - Dynamic: Good  Standing - Static: Good  Standing - Dynamic: Good  Comments: Pt able to static stand with no LOB while standing without UE support. Exercises:  Standing exercise program: Hip flexion, mini squats, and hamstring curls. Reps: x 10 AROM, no UE support, CGA, 1 LOB noted needing CGA to correct. Standing head movement horizontal/vertical with eyes open x 1 minute each, no UE support, CGA, no LOB noted. Goals  Short term goals  Time Frame for Short term goals: 15  Short term goal 1: Pt will be independent with bed mobility/functional transfers  Short term goal 2: Pt will be able to ambulate 600ft with SBA  Short term goal 3: Pt will be able to navigate 10 steps with use of handrail and CGA  Short term goal 4: Pt will be able to participate in a 30 min PT session to demonstrate improved endurance.     Plan    Plan  Times per week: 5-6x week  Times per day: Daily  Current Treatment Recommendations: Strengthening, ROM, Balance Training, Functional Mobility Training, Transfer Training, Safety Education & Training, Home Exercise Program, Endurance Training, Pain Management, Stair training, Gait Training, Patient/Caregiver Education & Training  Safety Devices  Type of devices: Call light within reach, Gait belt, Left in chair, Nurse notified  Restraints  Initially in

## 2019-08-13 NOTE — PROGRESS NOTES
Patient is concerned about going to IP rehab after this admission. 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 He is interested in information regarding outpatient PT in order to come to a decision. Will continue to monitor.

## 2019-08-14 ENCOUNTER — HOSPITAL ENCOUNTER (OUTPATIENT)
Dept: PHYSICAL THERAPY | Facility: CLINIC | Age: 74
Setting detail: THERAPIES SERIES
Discharge: HOME | End: 2019-08-14
Payer: MEDICARE

## 2019-08-14 PROCEDURE — 97112 NEUROMUSCULAR REEDUCATION: CPT

## 2019-08-14 PROCEDURE — 97161 PT EVAL LOW COMPLEX 20 MIN: CPT

## 2019-08-14 PROCEDURE — 97110 THERAPEUTIC EXERCISES: CPT

## 2019-08-14 NOTE — CONSULTS
81688  [] Iontophoresis: 4 mg/mL Dexamethasone Sodium Phosphate  mAmin  45232   [x] Therapeutic Activity  16416 [] Vasopneumatic cold with compression  56080    [] Gait Training   02605 [] Ultrasound   14531   [x] Neuromuscular Re-education  37850 [] Electrical Stimulation Unattended  90235   [x] Manual Therapy  92119 [] Electrical Stimulation Attended  90936   [x] Instruction in HEP  [x] Dry Needling   [] Aquatic Therapy   01655 [] Cold/hotpack    [] Massage   26869      [] Lumbar/Cervical Traction  30105     []  Medication allergies reviewed for use of    Dexamethasone Sodium Phosphate 4mg/ml     with iontophoresis treatments. Pt is not allergic. Frequency: 2-3 x/weeks for 18-21 visits (6-8 weeks)    Todays Treatment:  Modalities:   Exercises: CGA for all balance exercises  Exercise Reps/ Time Weight/ Level Comments   NuStep 10'           Heel raise/toe raise foam 20x  No shoes   Feet together EC foam 1'     Tight rope walking EO 2L  // bars   Standing marching foam 30x  No shoes   SL balance  2x1'  On R, shoes on   3 way hip 15x ea Lime grn bilaterally         Myotome strength      Sh Abd 2x15 2#    Bicep Curl 2x15 3#    Tri Ext 15x Bberry    Wrist Flex 20x 3#    Wrist Ext 20x 3#    Tennis ball squeeze 2' x 3''           Hip Flexion 2x15 2#    Knee Ext 2x15 2#                Other:     Towel sensitization:   1' R LE distally  1' R UE distally      Specific Instructions for next treatment: Continue progressing myotomal strengthening/balance activities    Evaluation Complexity:  History (Personal factors, comorbidities) [x] 0 [] 1-2 [] 3+   Exam (limitations, restrictions) [x] 1-2 [] 3 [] 4+   Clinical presentation (progression) [x] Stable [] Evolving  [] Unstable   Decision Making [x] Low [] Moderate [] High    [x] Low Complexity [] Moderate Complexity [] High Complexity       Treatment Charges: Mins Units   [x] Evaluation       [x]  Low       []  Moderate       []  High 20 1   []  Modalities [x]  Ther Exercise 25 2   [x]  Manual Therapy 5 0   []  Ther Activities     []  Aquatics     []  Vasocompression     [x]  Other:NMR 15 1     TOTAL TREATMENT TIME: 65    Time in:2:30pm     Time out: 3:45pm    Electronically signed by: Hugh Kam PT        Physician Signature:________________________________Date:__________________  By signing above or cosigning this note, I have reviewed this plan of care and certify a need for medically necessary rehabilitation services.      *PLEASE SIGN ABOVE AND FAX BACK ALL PAGES*

## 2019-08-15 ENCOUNTER — HOSPITAL ENCOUNTER (OUTPATIENT)
Dept: PHYSICAL THERAPY | Facility: CLINIC | Age: 74
Setting detail: THERAPIES SERIES
Discharge: HOME | End: 2019-08-15
Payer: MEDICARE

## 2019-08-15 PROCEDURE — 97110 THERAPEUTIC EXERCISES: CPT

## 2019-08-15 PROCEDURE — 97112 NEUROMUSCULAR REEDUCATION: CPT

## 2019-08-15 NOTE — PRE-CERTIFICATION NOTE
Medicare Cap   [x] Physical Therapy  [] Speech Therapy  [] Occupational therapy  *PT and Speech caps combine      $2010 Cap limit < kx modifier needed < $6343 requires pre-cert        Patient Name: Valentin Olivas  YOB: 1945    Note:  This is an estimate of charges billed.      Date of [address] Name # units/ charge $$$ charge Daily Total Charge Ongoing Total $$$   8/14/19 EVAL + 2TE+NMR 4 81.73+26.09+23.17*2 154.16 154.16   8/15/19 2TE+2NMR 4 33.73+26.09+23.17*2 106.16 260.32

## 2019-08-16 ENCOUNTER — TELEPHONE (OUTPATIENT)
Dept: NEUROLOGY | Age: 74
End: 2019-08-16

## 2019-08-16 NOTE — TELEPHONE ENCOUNTER
Mr. Nettles called in. He had a stroke and was seen in hospital. He has a [address] with Dr. Sydnie Ontiveros scheduled. He states that after the stroke he was left with some numbness in the rt. hand and cheek and it had been improving. He had a full PT session yesterday and this morning he seems to notice an increase of numbness in the rt. side. It is not as bad as it was but is slightly increased. He is taking his meds as prescribed. He wonders if this is something can can happen after a stroke or should he be c oncnered that the stroke has worsened. I moved up his [address] appt to Wed. and I told him I will try to discuss with Dr. Elan Kumari who is on call for the group today as Dr. Sydnie Ontiveros is not scheduled in office today.

## 2019-08-16 NOTE — TELEPHONE ENCOUNTER
This was discussed with Dr. Allison Allred. He said sensory symptoms such as numbness typically are not a concern. This could be a result of possibly overexertion or just body . If he has weakness or other new symptoms then he should go to ER for evaluation. I spoke with Lynda Montero and gave him the message. He voiced understanding.

## 2019-08-19 ENCOUNTER — HOSPITAL ENCOUNTER (OUTPATIENT)
Dept: PHYSICAL THERAPY | Facility: CLINIC | Age: 74
Setting detail: THERAPIES SERIES
Discharge: HOME | End: 2019-08-19
Payer: MEDICARE

## 2019-08-19 PROCEDURE — 97110 THERAPEUTIC EXERCISES: CPT

## 2019-08-19 PROCEDURE — 97112 NEUROMUSCULAR REEDUCATION: CPT

## 2019-08-19 NOTE — FLOWSHEET NOTE
[] [facility]) - [facility] &  Therapy  [address]  [phone]  F: [phone] [] [address]  [place]   [address]  P: [phone]  F: [phone] [x] [address]  Therapy  [address]  P: [phone]  F: [phone] [] [address]  [facility]   [address]  [place]: [phone]  F: [phone]     Physical Therapy Daily Treatment Note    Date:  2019  Patient Name:  Pavel Osborne    :  1945  MRN: [id number]  Physician: Kathleen Mungiua MD                                  Insurance: Medicare primary, med [employer] secondary, [facility] guidelines  Medical Diagnosis: I63.9 CVA, unspecified mechanism                 Rehab Codes: M62.81, I63.50  Onset date: 19               Next 's appt.: 19 outpatient f/u with neurologist  Visit# / total visits: 3/18-  Cancels/No Shows: 0    Subjective:    Pain:  [] Yes  [x] No Location:  N/A Pain Rating: (0-10 scale) -/10  Pain altered Tx:  [x] No  [] Yes  Action:  Comments: Patient states he has continued to perform his home exercises as well as has found clothespins to use for his finger dexterity.     Objective:  Modalities:   Precautions:  Exercises:     Exercise Reps/ Time Weight/ Level Comments    NuStep 10'  L4   x              Heel raise/toe raise foam 30x ea   x   Feet together EC foam 2x1'     x   Feet together EO foam coordination 3x15  Nose to finger while on foam 3x15, third set with moving finger x   Feet together EO stepping foam 20x B 8'' step Tapping opposite LE on top of 8'' step, back to foam, reverse, repeat x   Tight rope walking EO 2L ea   2L traditional, 2L head turns, 2L counting back from 40 x   Standing marching foam 30x   No shoes x   SL balance- foam  3x1'   R x   Jil walking 6x 12''  x   Lateral jil walking 6x 12''  x   Lateral jil walking 6x 12'' With airex foam between each jil for dynamic stability x   Step Ups 20x 8'' Up with R down with L x                 3 way hip 15x Lime grn  x              Myotome strength          Sh Abd 2x15 4# Mirror for symmetry x   Sh flexion 2x15 4# '' x   Bicep Curl 2x15 4#  '' x   Tri Ext 2x15 Gra   x   Wrist flex/ext 4x 2.5# Up/down wrist roller x           Tennis ball squeeze 2' x 3''     x          Finger dexterity 3x thru Black clothespins Grabbing off table, placing on each level, taking back off and placing back on table x   Finger strengthening 15x ea finger  Black for index/middle, red for ring, yellow for pinky x                     Hip Flexion 2x15 3#   x   Knee Ext 2x15 3#   x                                                             Towel sensitization:   2' R LE distally  2' R UE distally        Specific Instructions for next treatment: Continue progressing myotomal strengthening/balance activities      Treatment Charges: Mins Units   []  Modalities     [x]  Ther Exercise 29 2   []  Manual Therapy     []  Ther Activities     []  Aquatics     []  Vasocompression     [x]  Other: NMR 33 2   Total Treatment time 63 4       Assessment: [x] Progressing toward goals. Improving balance with decreased assist required throughout tight rope walking activities with addition of both vertical and horizontal head turns without losses of balance laterally. Improving time noted with finger dexterity as well noted this date. Patient notes sensation to light touch through C6-C8 dermatomes this visit on R UE which he hasn't prior. [] No change. [] Other:    STG: (to be met in 10 treatments)  1. Patient to demonstrate improved coordination to >10 nose to finger touches in 10'' for improved R UE coordination  2. ? Strength: Patient to demonstrate improved R UE/LE strength to 4+/5 within 4 weeks for improved ability to perform daily tasks  3.  Independent with Home Exercise

## 2019-08-19 NOTE — PROGRESS NOTES
..Stroke Follow Up Phone Call    Called phone number on record - No answer. Second attempt to contact pt after discharge.       Electronically signed by Adriana Cook RN on 8/19/19 at 10:37 AM

## 2019-08-21 ENCOUNTER — HOSPITAL ENCOUNTER (OUTPATIENT)
Dept: PHYSICAL THERAPY | Facility: CLINIC | Age: 74
Setting detail: THERAPIES SERIES
Discharge: HOME | End: 2019-08-21
Payer: MEDICARE

## 2019-08-21 ENCOUNTER — OFFICE VISIT (OUTPATIENT)
Dept: NEUROLOGY | Age: 74
End: 2019-08-21
Payer: MEDICARE

## 2019-08-21 VITALS
DIASTOLIC BLOOD PRESSURE: 81 MMHG | HEIGHT: 66 IN | SYSTOLIC BLOOD PRESSURE: 135 MMHG | WEIGHT: 145.8 LBS | HEART RATE: 72 BPM | BODY MASS INDEX: 23.43 KG/M2

## 2019-08-21 DIAGNOSIS — E78.5 DYSLIPIDEMIA: ICD-10-CM

## 2019-08-21 DIAGNOSIS — I63.81 ACUTE ISCHEMIC VERTEBROBASILAR ARTERY THALAMIC STROKE INVOLVING LEFT-SIDED VESSEL (HCC): Primary | ICD-10-CM

## 2019-08-21 DIAGNOSIS — R20.0 RIGHT SIDED NUMBNESS: ICD-10-CM

## 2019-08-21 PROCEDURE — 4040F PNEUMOC VAC/ADMIN/RCVD: CPT | Performed by: PSYCHIATRY & NEUROLOGY

## 2019-08-21 PROCEDURE — 97110 THERAPEUTIC EXERCISES: CPT

## 2019-08-21 PROCEDURE — 1111F DSCHRG MED/CURRENT MED MERGE: CPT | Performed by: PSYCHIATRY & NEUROLOGY

## 2019-08-21 PROCEDURE — G8427 DOCREV CUR MEDS BY ELIG CLIN: HCPCS | Performed by: PSYCHIATRY & NEUROLOGY

## 2019-08-21 PROCEDURE — 1036F TOBACCO NON-USER: CPT | Performed by: PSYCHIATRY & NEUROLOGY

## 2019-08-21 PROCEDURE — 99214 OFFICE O/P EST MOD 30 MIN: CPT | Performed by: PSYCHIATRY & NEUROLOGY

## 2019-08-21 PROCEDURE — 3017F COLORECTAL CA SCREEN DOC REV: CPT | Performed by: PSYCHIATRY & NEUROLOGY

## 2019-08-21 PROCEDURE — G8598 ASA/ANTIPLAT THER USED: HCPCS | Performed by: PSYCHIATRY & NEUROLOGY

## 2019-08-21 PROCEDURE — 1123F ACP DISCUSS/DSCN MKR DOCD: CPT | Performed by: PSYCHIATRY & NEUROLOGY

## 2019-08-21 PROCEDURE — G8420 CALC BMI NORM PARAMETERS: HCPCS | Performed by: PSYCHIATRY & NEUROLOGY

## 2019-08-21 PROCEDURE — 97112 NEUROMUSCULAR REEDUCATION: CPT

## 2019-08-21 SDOH — HEALTH STABILITY: MENTAL HEALTH: HOW OFTEN DO YOU HAVE A DRINK CONTAINING ALCOHOL?: MONTHLY OR LESS

## 2019-08-21 NOTE — PROGRESS NOTES
Symmetrical palate  XI    -     Symmetrical shoulder shrug  XII   -     Midline tongue, no atrophy    MOTOR FUNCTION:  significant for good strength of grade 5/5 in bilateral proximal and distal muscle groups of both upper and lower extremities with normal bulk, normal tone and no involuntary movements, no tremor   SENSORY FUNCTION:  impaired LT, PP, Temp on right side of the body   CEREBELLAR FUNCTION:  Intact fine motor control over upper limbs   REFLEX FUNCTION:  Symmetric, no perverted reflex, no Babinski sign   STATION and GAIT  Normal station, normal gait       CT head 8/11/2019: No acute intracranial abnormality. CTA head and neck 8/11/2019: Unremarkable. MRI brain 8/11/2019: Acute lacunar ischemic infarct in left thalamus. CBC:     Lab Results   Component Value Date    WBC 5.0 08/12/2019     08/12/2019      BMP:     Lab Results   Component Value Date     08/12/2019    K 3.9 08/12/2019    GLUCOSE 134 (H) 08/12/2019    CALCIUM 8.9 08/12/2019       No results found for: PHENYTOIN, PHENOBARB, VALPROATE, CBMZ    Lab Results   Component Value Date    CHOL 187 08/12/2019    LDLCHOLESTEROL 123 08/12/2019    HDL 43 08/12/2019    TRIG 107 08/12/2019    INR 1.1 08/11/2019    LABA1C 6.5 (H) 08/12/2019           Impression and Plan: Mr. Raysa Molina is a 69 y.o. male with   Recent hosp (8/11/19) for acute left thalamic ischemic stroke with resultant right-sided sensory loss; stable without any progression of sensory loss. Continue aspirin and Plavix for a total of 3 weeks with a target monotherapy with aspirin from September 9th  onwards along with statin for secondary stroke prophylaxis. Was advised to continue recommendations given by the therapists.    Questions/counseling regarding use of sildenafil, etc.     Cerebrovascular disease counseling: discussed about signs and symptoms that would suggest TIA and or CVA and should these develop in the interim; the patient was asked to go to the nearest ER. Otherwise I anticipate seeing the patient in follow up in the clinic. Also discussed about continuing meds to prevent stroke. These include antiplatelet meds and statin meds as outlined above for stroke prophylaxis. Follow-up in December 2019. Please note that portions of this note were completed with a voice recognition program.  Although every effort was made to ensure the accuracy of this  automated transcription, some errors in transcription may have occurred, occasionally words are mis-transcribed.

## 2019-08-21 NOTE — FLOWSHEET NOTE
Standing feet together EO foam- catch 25x 7oz red ball  x   Standing full tandem R in back EO- catch 20x 7oz red ball  x   Stance on BOSU 2x30''  Feet at shoulder width 2 sets, feet together 2 sets, SL stance 3 sets x                        3 way hip 15x Lime grn                Myotome strength          Sh Abd 2x15 5# Mirror for symmetry x   Sh flexion 2x15 5# '' x   Bicep Curl 2x15 7#  '' x   Tri Ext 2x15 Gray   x   Wrist flex/ext 4x 2.5# Up/down wrist roller x           Tennis ball squeeze 2' x 3''     x          Finger dexterity 3x thru Black clothespins Grabbing off table, placing on each level, taking back off and placing back on table x   Finger strengthening 15x ea finger  Black for index/middle, red for ring, yellow for pinky x                     Hip Flexion 2x15 3#   x   Knee Ext 2x15 3#   x                                                             Towel sensitization:   2' R LE distally  2' R UE distally        Specific Instructions for next treatment: Continue progressing myotomal strengthening/balance activities      Treatment Charges: Mins Units   []  Modalities     [x]  Ther Exercise 29 2   []  Manual Therapy     []  Ther Activities     []  Aquatics     []  Vasocompression     [x]  Other: NMR 33 2   Total Treatment time 63 4       Assessment: [x] Progressing toward goals. Continued progressions in balance tolerated well with ability to perform SLS and other balance exercises on BOSU ball. Continued compliance with HEP noted and strength and coordination improving. [] No change. [] Other:    STG: (to be met in 10 treatments)  1. Patient to demonstrate improved coordination to >10 nose to finger touches in 10'' for improved R UE coordination  2. ? Strength: Patient to demonstrate improved R UE/LE strength to 4+/5 within 4 weeks for improved ability to perform daily tasks  3. Independent with Home Exercise Programs     LTG: (to be met in 20 treatments)  1.  Patient to demonstrate improved

## 2019-08-23 ENCOUNTER — HOSPITAL ENCOUNTER (OUTPATIENT)
Dept: PHYSICAL THERAPY | Facility: CLINIC | Age: 74
Setting detail: THERAPIES SERIES
Discharge: HOME | End: 2019-08-23
Payer: MEDICARE

## 2019-08-23 PROCEDURE — 97112 NEUROMUSCULAR REEDUCATION: CPT

## 2019-08-23 PROCEDURE — 97110 THERAPEUTIC EXERCISES: CPT

## 2019-08-23 NOTE — FLOWSHEET NOTE
self-correct. Due to increased activity prior to PT session, patient verbalized increased exhaustion through LE following ambulation laps, thus various balance activities held this date. [] No change. [] Other:    STG: (to be met in 10 treatments)  1. Patient to demonstrate improved coordination to >10 nose to finger touches in 10'' for improved R UE coordination  2. ? Strength: Patient to demonstrate improved R UE/LE strength to 4+/5 within 4 weeks for improved ability to perform daily tasks  3. Independent with Home Exercise Programs     LTG: (to be met in 20 treatments)  1. Patient to demonstrate improved balance to feet together eyes open on foam to 61'' without UE assist required  2. Patient to demonstrate improved balance to ability to walk tight rope line for 20' without losses of balance  3. Patient to demonstrate >70/80 LEFS within 2 months for improved ability to return to prior level of function    Pt. Education:  [x] Yes  [] No  [x] Reviewed Prior HEP/Ed  Method of Education: [x] Verbal  [x] Demo  [x] Written  Comprehension of Education:  [x] Verbalizes understanding. [x] Demonstrates understanding. [x] Needs review. [] Demonstrates/verbalizes HEP/Ed previously given. Plan: [x] Continue per plan of care.    [] Other:      Time In: 200 pm            Time Out: 315 pm    Electronically signed by:  Claudetta Elm, PTA

## 2019-08-23 NOTE — PRE-CERTIFICATION NOTE
Medicare Cap   [x] Physical Therapy  [] Speech Therapy  [] Occupational therapy  *PT and Speech caps combine      $2010 Cap limit < kx modifier needed < $1333 requires pre-cert        Patient Name: Tristin Hare  YOB: 1945    Note:  This is an estimate of charges billed.      Date of [address] Name # units/ charge $$$ charge Daily Total Charge Ongoing Total $$$   8/14/19 EVAL + 2TE+NMR 4 81.73+26.09+23.17*2 154.16 154.16   8/15/19 2TE+2NMR 4 33.73+26.09+23.17*2 106.16 260.32   8/19/19 2TE, 2NMR 4 same 106.16 366.48   8/21/19 2TE, 2NMR 4 same 106.16 472.64   8/23/19 2TE, 2 NMR 4  106.16 578.80

## 2019-08-26 ENCOUNTER — HOSPITAL ENCOUNTER (OUTPATIENT)
Dept: PHYSICAL THERAPY | Facility: CLINIC | Age: 74
Setting detail: THERAPIES SERIES
Discharge: HOME | End: 2019-08-26
Payer: MEDICARE

## 2019-08-26 PROCEDURE — 97110 THERAPEUTIC EXERCISES: CPT

## 2019-08-26 PROCEDURE — 97112 NEUROMUSCULAR REEDUCATION: CPT

## 2019-08-26 NOTE — PRE-CERTIFICATION NOTE
Medicare Cap   [x] Physical Therapy  [] Speech Therapy  [] Occupational therapy  *PT and Speech caps combine      $2010 Cap limit < kx modifier needed < $3832 requires pre-cert        Patient Name: Natividad Saunders  YOB: 1945    Note:  This is an estimate of charges billed.      Date of [address] Name # units/ charge $$$ charge Daily Total Charge Ongoing Total $$$   8/14/19 EVAL + 2TE+NMR 4 81.73+26.09+23.17*2 154.16 154.16   8/15/19 2TE+2NMR 4 33.73+26.09+23.17*2 106.16 260.32   8/19/19 2TE, 2NMR 4 same 106.16 366.48   8/21/19 2TE, 2NMR 4 same 106.16 472.64   8/23/19 2TE, 2 NMR 4  106.16 578.80   8/26/19 2TE, 2 NMR 4  106.16 684.96

## 2019-08-26 NOTE — FLOWSHEET NOTE
[] [facility]) [facility] &  Therapy  [address]  [phone]  F: [phone] [] [address]  [place]   [address]  P: [phone]  F: [phone] [x] [address]  Therapy  [address]  P: [phone]  F: [phone] [] [address]  [facility]   [address]  [place]: [phone]  F: [phone]     Physical Therapy Daily Treatment Note    Date:  2019  Patient Name:  Sudha Smiley    :  1945  MRN: [id number]  Physician: Anamaria Matthew MD                                  Insurance: Medicare primary, med [employer] secondary, [facility] guidelines  Medical Diagnosis: I63.9 CVA, unspecified mechanism                 Rehab Codes: M62.81, I63.50  Onset date: 19               Next 's appt.: 19 outpatient f/u with neurologist  Visit# / total visits: -  Cancels/No Shows: 0    Subjective:    Pain:  [] Yes  [x] No Location:  N/A Pain Rating: (0-10 scale) -/10  Pain altered Tx:  [x] No  [] Yes  Action:  Comments: Patient notes compliance with HEP and he feels as though his arms are doing better and feet as well. He feels as though his R foot is about 85% sensation compared to the other side at this time.      Objective:    4 clothespin on and off: 19  R 20''  L 15''    Modalities:   Precautions:  Exercises:     Exercise Reps/ Time Weight/ Level Comments    NuStep 15' L6   x              Heel raise/toe raise foam 30x ea   x   Feet together EO foam coordination 3x15  Nose to finger while on foam 3x15, third set with moving finger    Feet together EO stepping foam 20x B 8'' step Tapping opposite LE on top of 8'' step, back to foam, reverse, repeat    Tight rope walking EO 2L ea   2L traditional, 2L head turns, 2L counting back from 40 x   Standing marching foam 30x   No shoes

## 2019-08-28 ENCOUNTER — HOSPITAL ENCOUNTER (OUTPATIENT)
Dept: PHYSICAL THERAPY | Facility: CLINIC | Age: 74
Setting detail: THERAPIES SERIES
Discharge: HOME | End: 2019-08-28
Payer: MEDICARE

## 2019-08-28 PROCEDURE — 97112 NEUROMUSCULAR REEDUCATION: CPT

## 2019-08-28 PROCEDURE — 97110 THERAPEUTIC EXERCISES: CPT

## 2019-08-28 NOTE — FLOWSHEET NOTE
[] [facility]) - [facility] &  Therapy  [address]  [phone]  F: [phone] [] [address]  [facility]   [address]  P: [phone]  F: [phone] [x] [address]  Therapy  [address] [facility]  P: [phone]  F: [phone] [] [address]  [facility]   [address]  [place]: [phone]  F: [phone]     Physical Therapy Daily Treatment Note    Date:  2019  Patient Name:  Pavel Osborne    :  1945  MRN: [id number]  Physician: Kathleen Munguia MD                                  Insurance: Medicare primary, med [employer] secondary,  [facility] [employer] [facility] guidelines  Medical Diagnosis: I63.9 CVA, unspecified mechanism                 Rehab Codes: M62.81, I63.50  Onset date: 19               Next 's appt.: 19 outpatient f/u with neurologist  Visit# / total visits: -  Cancels/No Shows: 0    Subjective:    Pain:  [] Yes  [x] No Location:  N/A Pain Rating: (0-10 scale) -/10  Pain altered Tx:  [x] No  [] Yes  Action:  Comments: Patient notes frustration with coming and going of sensation through R hand/fingers, states feels more sensation through the day, then goes to bed and wakes up with increased numbness again.       Objective:    4 clothespin on and off: 19  R 20''  L 15''    Modalities:   Precautions:  Exercises:     Exercise Reps/ Time Weight/ Level Comments    NuStep 10' L6   x              Heel raise/toe raise foam 30x ea   x   Feet together EO foam coordination 3x15  Nose to finger while on foam 3x15, third set with moving finger    Feet together EO stepping foam 20x B 8'' step Tapping opposite LE on top of 8'' step, back to foam, reverse, repeat    Tight rope walking EO 2L ea   2L traditional, 2L head turns, 2L counting back from 40    Standing marching foam 30x   No shoes    DL balance-foam EC 3x30\"  Perturbations X    SL balance- foam  3x1'   R, 2 completed 8/23 x   Jil walking 3x 12\" Heel-toe walking between 3 hurdles x   Jil walking 3x 12''     Lateral jil walking 3x 12'' Side steps between 3 hurdles x   Lateral jil walking 6x 12'' With airex foam between each jil for dynamic stability    Standing feet together EO foam- catch 25x 7oz red ball  x   Standing full tandem R in back EO- catch 20x 7oz red ball  x   Stance on BOSU 2x30''  Feet at shoulder width 2 sets, feet together 2 sets, SL stance 3 sets x   BOSU squats 2x15   x   Semi tandem foam- catch 15x  R foot behind x   Steps over jil 25x 12'' jil Lateral and ap- stepping and landing on foam x   Airex foot tap   10x  Standing on airex in // bars, touching R foot to 3 cones, then L x   3 way hip 15x Lime grn   x              Myotome strength          Sh Abd 2x15 5# Mirror for symmetry x   Sh flexion 2x15 5# '' x   Bicep Curl 2x15 7#  '' x   Tri Ext 2x15 Gray   x   Wrist flex/ext 4x 2.5# Up/down wrist roller x           Tennis ball squeeze 2' x 3''     x          Finger dexterity 4x thru Black clothespins Grabbing off table, placing on each level, taking back off and placing back on table x   Finger strengthening 15x ea finger  Black for index/middle, red for ring, yellow for pinky x   Tband finger abduction 25x Red finger web  x              Hip Flexion 2x15 6#      Knee Ext 2x15 6#                   Towel sensitization:   2' R LE distally  2' R UE distally        Specific Instructions for next treatment: Continue progressing myotomal strengthening/balance activities      Treatment Charges: Mins Units   []  Modalities     [x]  Ther Exercise 30 2   []  Manual Therapy     []  Ther Activities     []  Aquatics     []  Vasocompression     [x]  Other: NMR 35 2   Total Treatment time 65 4       Assessment: [x] Progressing toward goals.  Continued to progress balance exercises and challenge single leg strength and

## 2019-08-30 ENCOUNTER — TELEPHONE (OUTPATIENT)
Dept: NEUROLOGY | Age: 74
End: 2019-08-30

## 2019-08-30 ENCOUNTER — HOSPITAL ENCOUNTER (OUTPATIENT)
Dept: PHYSICAL THERAPY | Facility: CLINIC | Age: 74
Setting detail: THERAPIES SERIES
Discharge: HOME | End: 2019-08-30
Payer: MEDICARE

## 2019-08-30 PROCEDURE — 97112 NEUROMUSCULAR REEDUCATION: CPT

## 2019-08-30 PROCEDURE — 97110 THERAPEUTIC EXERCISES: CPT

## 2019-08-30 NOTE — FLOWSHEET NOTE
[] [facility]) - [facility] &  Therapy  [address]  [phone]  F: [phone] [] [address]  [place] [address]   [address]  P: [phone]  F: [phone] [x] [address]  Therapy  [address]  P: [phone]  F: [phone] [] [address]  [facility]   [address]  [place]: [phone]  F: [phone]     Physical Therapy Daily Treatment Note    Date:  2019  Patient Name:  Karrie Ozuna    :  [birth year]  MRN: [id number]  Physician: Raudel Connor MD                                  Insurance: Medicare primary, med [employer] secondary, [facility] guidelines  Medical Diagnosis: I63.9 CVA, unspecified mechanism                 Rehab Codes: M62.81, I63.50  Onset date: 19               Next 's appt.: 19 outpatient f/u with neurologist  Visit# / total visits: -  Cancels/No Shows: 0    Subjective:    Pain:  [] Yes  [x] No Location:  N/A Pain Rating: (0-10 scale) -/10  Pain altered Tx:  [x] No  [] Yes  Action:  Comments: Patient states overall feeling good with symptoms fairly unchanged. Continues with frustration regarding numbness in fingers.        Objective:    4 clothespin on and off: 19  R 20''  L 15''    Modalities:   Precautions:  Exercises:     Exercise Reps/ Time Weight/ Level Comments    NuStep 10' L6   x              Heel raise/toe raise foam 30x ea   x   Feet together EO foam coordination 3x15  Nose to finger while on foam 3x15, third set with moving finger    Feet together EO stepping foam 20x B 8'' step Tapping opposite LE on top of 8'' step, back to foam, reverse, repeat    Tight rope walking EO 2L ea   2L traditional, 2L head turns, 2L counting back from 40    Standing marching foam 30x   No shoes    DL balance-foam EC 3x30\"  Perturbations X    SL balance- foam  3x1'

## 2019-09-03 ENCOUNTER — HOSPITAL ENCOUNTER (OUTPATIENT)
Dept: PHYSICAL THERAPY | Facility: CLINIC | Age: 74
Setting detail: THERAPIES SERIES
Discharge: HOME | End: 2019-09-03
Payer: MEDICARE

## 2019-09-03 PROCEDURE — 97110 THERAPEUTIC EXERCISES: CPT

## 2019-09-03 PROCEDURE — 97112 NEUROMUSCULAR REEDUCATION: CPT

## 2019-09-04 ENCOUNTER — HOSPITAL ENCOUNTER (OUTPATIENT)
Dept: PHYSICAL THERAPY | Facility: CLINIC | Age: 74
Setting detail: THERAPIES SERIES
Discharge: HOME | End: 2019-09-04
Payer: MEDICARE

## 2019-09-04 PROCEDURE — 97110 THERAPEUTIC EXERCISES: CPT

## 2019-09-04 PROCEDURE — 97112 NEUROMUSCULAR REEDUCATION: CPT

## 2019-09-04 NOTE — PRE-CERTIFICATION NOTE
Medicare Cap   [x] Physical Therapy  [] Speech Therapy  [] Occupational therapy  *PT and Speech caps combine      $2010 Cap limit < kx modifier needed < $4685 requires pre-cert        Patient Name: Raysa Molina  YOB: 1945    Note:  This is an estimate of charges billed.      Date of [address] Name # units/ charge $$$ charge Daily Total Charge Ongoing Total $$$   8/14/19 EVAL + 2TE+NMR 4 81.73+26.09+23.17*2 154.16 154.16   8/15/19 2TE+2NMR 4 33.73+26.09+23.17*2 106.16 260.32   8/19/19 2TE, 2NMR 4 same 106.16 366.48   8/21/19 2TE, 2NMR 4 same 106.16 472.64   8/23/19 2TE, 2 NMR 4  106.16 578.80   8/26/19 2TE, 2 NMR 4  106.16 684.96   8/26    106.16 791.12   8/28    106.16 897.28   9/3/19 2TE, 2 NMR 4  106.16 1,003.44   9/4/19 2 TE, 2 NMR 4  106.16 1,109.60

## 2019-09-04 NOTE — FLOWSHEET NOTE
SL balance- foam  3x1'   R x   Jil walking 3x 12\" Heel-toe walking between 3 hurdles x   Jil walking 3x 12''  x   Lateral jil walking 3x 12'' Side steps between 3 hurdles x   Lateral jil walking 6x 12'' With airex foam between each jil for dynamic stability x   Standing feet together EO foam- catch 25x 14oz red ball  x   Standing full tandem R in back EO- catch 20x 7oz red ball     Stance on BOSU 2x30''  Feet at shoulder width 2 sets, feet together 2 sets, SL stance 3 sets x   BOSU squats 2x15   x   Semi tandem foam- catch 15x  R foot behind x   Steps over jil 25x 12'' jil Lateral and ap- stepping and landing on foam x   Airex foot tap   2x10  Standing on airex in // bars, touching R foot to 4 cones, then L x   BOSU cone tap 2x10  1x5  Standing on blue side, L foot touching one cone    3 way hip 15x Lime grn                Myotome strength          Sh Abd 2x15 5# Mirror for symmetry    Sh flexion 2x15 5# ''    Bicep Curl 2x15 7#  '' x   Tri Ext 2x15 Gray   x   Wrist flex/ext 4x 2.5# Up/down wrist roller x           Tennis ball squeeze 2' x 3''     x          Finger dexterity 4x thru Black clothespins Grabbing off table, placing on each level, taking back off and placing back on table x   Finger strengthening 20x ea finger  Black for index/middle, red for ring, yellow for pinky x   Tband finger abduction 25x Red finger web     Finger dexterity/sensitization  3x thru    Gathers screws and washers with R hand, lays on table, picks up pinching with fingers x   Pennies 4x thru  10 pennies picking up and putting into \"piggy bank\" x   Digiflex 15x each finger Green  x   Hip Flexion 2x15 6#      Knee Ext 2x15 6#                   Towel sensitization:   2' R LE distally  2' R UE distally        Specific Instructions for next treatment: Continue progressing myotomal strengthening/balance activities      Treatment Charges: Mins Units   []  Modalities     [x]  Ther Exercise 30 2   []  Manual Therapy

## 2019-09-06 ENCOUNTER — HOSPITAL ENCOUNTER (OUTPATIENT)
Dept: PHYSICAL THERAPY | Facility: CLINIC | Age: 74
Setting detail: THERAPIES SERIES
Discharge: HOME | End: 2019-09-06
Payer: MEDICARE

## 2019-09-06 PROCEDURE — 97110 THERAPEUTIC EXERCISES: CPT

## 2019-09-06 PROCEDURE — 97112 NEUROMUSCULAR REEDUCATION: CPT

## 2019-09-09 ENCOUNTER — HOSPITAL ENCOUNTER (OUTPATIENT)
Dept: PHYSICAL THERAPY | Facility: CLINIC | Age: 74
Setting detail: THERAPIES SERIES
Discharge: HOME | End: 2019-09-09
Payer: MEDICARE

## 2019-09-09 PROCEDURE — 97110 THERAPEUTIC EXERCISES: CPT

## 2019-09-09 PROCEDURE — 97112 NEUROMUSCULAR REEDUCATION: CPT

## 2019-09-11 ENCOUNTER — HOSPITAL ENCOUNTER (OUTPATIENT)
Dept: PHYSICAL THERAPY | Facility: CLINIC | Age: 74
Setting detail: THERAPIES SERIES
Discharge: HOME | End: 2019-09-11
Payer: MEDICARE

## 2019-09-11 PROCEDURE — 97112 NEUROMUSCULAR REEDUCATION: CPT

## 2019-09-11 PROCEDURE — 97110 THERAPEUTIC EXERCISES: CPT

## 2019-09-11 NOTE — FLOWSHEET NOTE
understanding. [x] Demonstrates understanding. [x] Needs review. [] Demonstrates/verbalizes HEP/Ed previously given. Plan: [x] Continue per plan of care.    [] Other:      Time In: 230 pm            Time Out: 345 pm    Electronically signed by:  Frank Mccarthy PTA

## 2019-09-23 ENCOUNTER — HOSPITAL ENCOUNTER (OUTPATIENT)
Dept: PHYSICAL THERAPY | Facility: CLINIC | Age: 74
Setting detail: THERAPIES SERIES
Discharge: HOME | End: 2019-09-23
Payer: MEDICARE

## 2019-09-23 PROCEDURE — 97018 PARAFFIN BATH THERAPY: CPT

## 2019-09-23 PROCEDURE — 97112 NEUROMUSCULAR REEDUCATION: CPT

## 2019-09-23 PROCEDURE — 97110 THERAPEUTIC EXERCISES: CPT

## 2019-09-23 NOTE — FLOWSHEET NOTE
balance-BOSU EC 3x30\"  Blue side BOSU x   SL balance- foam  3x30\"   R- perterbations, patient facing away    Jil walking 3x 12\" Heel-toe walking between 3 hurdles    Jil walking 3x 12''     Lateral jil walking 3x 12'' Side steps between 3 hurdles    Lateral jil walking 6x 12'' With airex foam between each jil for dynamic stability    Lateral obstacle course 6x  Hurdles, BOSU, Airex    Obstacle course -- FWD and RETRO 6x  Hurdles, BOSU, Airex, cones    BOSU ball catch 2x20 7oz Feet together, eyes open, blue side BOSU    Standing full tandem R in back EO- catch 20x 7oz red ball     Stance on BOSU 2x30''  Feet at shoulder width 2 sets, feet together 2 sets, SL stance 3 sets    BOSU squats 2x15   x   Semi tandem foam- catch 15x  R foot behind    Steps over jil 25x 12'' jil Lateral and ap- stepping and landing on foam    Airex foot tap   2x10  Standing on airex in // bars, touching R foot to 4 cones, then L    BOSU cone tap 3x10  Standing on blue side, L foot touching one cone    3 way hip 15x Lime grn     Rebounder 3 way 2x15 1.5# R foot     Step Ups 2x15 BOSU   x   Myotome strength          Sh Abd 2x15 5# Mirror for symmetry    Sh flexion 2x15 5# ''    Bicep Curl 2x15 7#  ''    Tri Ext 2x15 Gray      Wrist flex/ext 4x 2.5# Up/down wrist roller            Tennis ball squeeze 2' x 3''               Finger dexterity 5x thru Black clothespins Grabbing off table, placing on each level, taking back off and placing back on table    Finger strengthening 20x ea finger  Black for index/middle, red for ring, yellow for pinky    Tband finger abduction 25x Red finger web     Finger dexterity/sensitization 4x thru    Gathers screws and washers with R hand, lays on table, picks up pinching with fingers    Pennies 4x thru  10 pennies picking up and putting into \"piggy bank\"    Digiflex 2x15 each finger Green     Paperclips 2x  Picking paperclips out of box, making trains of 10           Hip Flexion 2x15 6#

## 2019-09-25 ENCOUNTER — HOSPITAL ENCOUNTER (OUTPATIENT)
Dept: PHYSICAL THERAPY | Facility: CLINIC | Age: 74
Setting detail: THERAPIES SERIES
Discharge: HOME | End: 2019-09-25
Payer: MEDICARE

## 2019-09-25 PROCEDURE — 97018 PARAFFIN BATH THERAPY: CPT

## 2019-09-25 PROCEDURE — 97110 THERAPEUTIC EXERCISES: CPT

## 2019-09-25 PROCEDURE — 97112 NEUROMUSCULAR REEDUCATION: CPT

## 2019-09-25 NOTE — PRE-CERTIFICATION NOTE
Medicare Cap   [x] Physical Therapy  [] Speech Therapy  [] Occupational therapy  *PT and Speech caps combine      $2010 Cap limit < kx modifier needed < $8917 requires pre-cert        Patient Name: Kika Rain  YOB: 1945    Note:  This is an estimate of charges billed.      Date of [address] Name # units/ charge $$$ charge Daily Total Charge Ongoing Total $$$   8/14/19 EVAL + 2TE+NMR 4 81.73+26.09+23.17*2 154.16 154.16   8/15/19 2TE+2NMR 4 33.73+26.09+23.17*2 106.16 260.32   8/19/19 2TE, 2NMR 4 same 106.16 366.48   8/21/19 2TE, 2NMR 4 same 106.16 472.64   8/23/19 2TE, 2 NMR 4  106.16 578.80   8/26/19 2TE, 2 NMR 4  106.16 684.96   8/26    106.16 791.12   8/28    106.16 897.28   9/3/19 2TE, 2 NMR 4  106.16 1,003.44   9/4/19 2 TE, 2 NMR 4  106.16 1,109.60   9/619 3 TE, 1 NMR 4  109.08 1,215.68   9/23/19 2 NM, 2 TE, parafin 5 106.16+4.60 110.66 1,326.24   9/25/19 2 TE, NMR, paraffin 4 33.73+23.17*2+4.60 84.,67 1,410.91

## 2019-09-25 NOTE — FLOWSHEET NOTE
finger pressing- blue block 10x   x     Towel sensitization:   2' R LE distally  2' R UE distally        Specific Instructions for next treatment: Continue progressing myotomal strengthening/balance activities      Treatment Charges: Mins Units   [x]  Modalities: Parafin 10 1   [x]  Ther Exercise 30 2   []  Manual Therapy     []  Ther Activities     []  Aquatics     []  Vasocompression     [x]  Other: NMR 20 1   Total Treatment time 60 4       Assessment: [x] Progressing toward goals. Patient continues to demonstrate improvements in balance. SLS balance on R>L therefore will work exclusively more towards finger/hand activity and less on balance and LE strength. [] No change. [] Other:        STG: (to be met in 10 treatments)  1. Patient to demonstrate improved coordination to >10 nose to finger touches in 10'' for improved R UE coordination  2. ? Strength: Patient to demonstrate improved R UE/LE strength to 4+/5 within 4 weeks for improved ability to perform daily tasks  3. Independent with Home Exercise Programs     LTG: (to be met in 20 treatments)  1. Patient to demonstrate improved balance to feet together eyes open on foam to 61'' without UE assist required  2. Patient to demonstrate improved balance to ability to walk tight rope line for 20' without losses of balance  3. Patient to demonstrate >70/80 LEFS within 2 months for improved ability to return to prior level of function    Pt. Education:  [x] Yes  [] No  [x] Reviewed Prior HEP/Ed  Method of Education: [x] Verbal  [x] Demo  [x] Written  Comprehension of Education:  [x] Verbalizes understanding. [x] Demonstrates understanding. [x] Needs review. [] Demonstrates/verbalizes HEP/Ed previously given. Plan: [x] Continue per plan of care.    [] Other:      Time In:10:00am          Time Out: 11:02am    Electronically signed by:  Belkis Hameed, PT

## 2019-09-27 ENCOUNTER — HOSPITAL ENCOUNTER (OUTPATIENT)
Dept: PHYSICAL THERAPY | Facility: CLINIC | Age: 74
Setting detail: THERAPIES SERIES
Discharge: HOME | End: 2019-09-27
Payer: MEDICARE

## 2019-09-27 PROCEDURE — 97018 PARAFFIN BATH THERAPY: CPT

## 2019-09-27 PROCEDURE — 97110 THERAPEUTIC EXERCISES: CPT

## 2019-09-27 PROCEDURE — 97112 NEUROMUSCULAR REEDUCATION: CPT

## 2019-09-27 NOTE — FLOWSHEET NOTE
treatment: Continue progressing myotomal strengthening/balance activities      Treatment Charges: Mins Units   [x]  Modalities: Parafin 10 1   [x]  Ther Exercise 30 2   []  Manual Therapy     []  Ther Activities     []  Aquatics     []  Vasocompression     [x]  Other: NMR 20 1   Total Treatment time 60 4       Assessment: [x] Progressing toward goals. Pt begins treatment with 15' in bike stating this is what they discussed him doing last visit. Pt continues with treatment as listed above with good tolerance. Able to perform finger dexterity exercise with black clothes pins with each finger. Pt states he continues to notice improvement with ADLs. [] No change. [] Other:        STG: (to be met in 10 treatments)  1. Patient to demonstrate improved coordination to >10 nose to finger touches in 10'' for improved R UE coordination  2. ? Strength: Patient to demonstrate improved R UE/LE strength to 4+/5 within 4 weeks for improved ability to perform daily tasks  3. Independent with Home Exercise Programs     LTG: (to be met in 20 treatments)  1. Patient to demonstrate improved balance to feet together eyes open on foam to 61'' without UE assist required  2. Patient to demonstrate improved balance to ability to walk tight rope line for 20' without losses of balance  3. Patient to demonstrate >70/80 LEFS within 2 months for improved ability to return to prior level of function    Pt. Education:  [x] Yes  [] No  [x] Reviewed Prior HEP/Ed  Method of Education: [x] Verbal  [x] Demo  [x] Written  Comprehension of Education:  [x] Verbalizes understanding. [x] Demonstrates understanding. [x] Needs review. [] Demonstrates/verbalizes HEP/Ed previously given. Plan: [x] Continue per plan of care.    [] Other:      Time In: 3:00pm          Time Out: 4:10pm    Electronically signed by:  Zita Mina PTA

## 2019-09-30 ENCOUNTER — HOSPITAL ENCOUNTER (OUTPATIENT)
Dept: PHYSICAL THERAPY | Facility: CLINIC | Age: 74
Setting detail: THERAPIES SERIES
Discharge: HOME | End: 2019-09-30
Payer: MEDICARE

## 2019-09-30 PROCEDURE — 97112 NEUROMUSCULAR REEDUCATION: CPT

## 2019-09-30 PROCEDURE — 97018 PARAFFIN BATH THERAPY: CPT

## 2019-09-30 PROCEDURE — 97110 THERAPEUTIC EXERCISES: CPT

## 2019-10-02 ENCOUNTER — HOSPITAL ENCOUNTER (OUTPATIENT)
Dept: PHYSICAL THERAPY | Facility: CLINIC | Age: 74
Setting detail: THERAPIES SERIES
Discharge: HOME | End: 2019-10-02
Payer: MEDICARE

## 2019-10-02 PROCEDURE — 97018 PARAFFIN BATH THERAPY: CPT

## 2019-10-02 PROCEDURE — 97110 THERAPEUTIC EXERCISES: CPT

## 2019-10-07 ENCOUNTER — HOSPITAL ENCOUNTER (OUTPATIENT)
Dept: PHYSICAL THERAPY | Facility: CLINIC | Age: 74
Setting detail: THERAPIES SERIES
Discharge: HOME | End: 2019-10-07
Payer: MEDICARE

## 2019-10-07 PROCEDURE — 97110 THERAPEUTIC EXERCISES: CPT

## 2019-10-07 PROCEDURE — 97018 PARAFFIN BATH THERAPY: CPT

## 2019-10-09 ENCOUNTER — HOSPITAL ENCOUNTER (OUTPATIENT)
Dept: PHYSICAL THERAPY | Facility: CLINIC | Age: 74
Setting detail: THERAPIES SERIES
Discharge: HOME | End: 2019-10-09
Payer: MEDICARE

## 2019-10-09 PROCEDURE — 97018 PARAFFIN BATH THERAPY: CPT

## 2019-10-09 PROCEDURE — 97110 THERAPEUTIC EXERCISES: CPT

## 2019-10-14 ENCOUNTER — HOSPITAL ENCOUNTER (OUTPATIENT)
Dept: PHYSICAL THERAPY | Facility: CLINIC | Age: 74
Setting detail: THERAPIES SERIES
Discharge: HOME | End: 2019-10-14
Payer: MEDICARE

## 2019-10-14 PROCEDURE — 97110 THERAPEUTIC EXERCISES: CPT

## 2019-10-14 PROCEDURE — 97018 PARAFFIN BATH THERAPY: CPT

## 2019-10-14 PROCEDURE — 97164 PT RE-EVAL EST PLAN CARE: CPT

## 2019-10-16 ENCOUNTER — HOSPITAL ENCOUNTER (OUTPATIENT)
Dept: PHYSICAL THERAPY | Facility: CLINIC | Age: 74
Setting detail: THERAPIES SERIES
Discharge: HOME | End: 2019-10-16
Payer: MEDICARE

## 2019-10-16 PROCEDURE — 97110 THERAPEUTIC EXERCISES: CPT

## 2019-10-16 PROCEDURE — 97018 PARAFFIN BATH THERAPY: CPT

## 2019-10-21 ENCOUNTER — HOSPITAL ENCOUNTER (OUTPATIENT)
Dept: PHYSICAL THERAPY | Facility: CLINIC | Age: 74
Setting detail: THERAPIES SERIES
Discharge: HOME | End: 2019-10-21
Payer: MEDICARE

## 2019-10-21 PROCEDURE — 97018 PARAFFIN BATH THERAPY: CPT

## 2019-10-21 PROCEDURE — 97110 THERAPEUTIC EXERCISES: CPT

## 2019-10-23 ENCOUNTER — HOSPITAL ENCOUNTER (OUTPATIENT)
Dept: PHYSICAL THERAPY | Facility: CLINIC | Age: 74
Setting detail: THERAPIES SERIES
Discharge: HOME | End: 2019-10-23
Payer: MEDICARE

## 2019-10-23 PROCEDURE — 97018 PARAFFIN BATH THERAPY: CPT

## 2019-10-23 PROCEDURE — 97110 THERAPEUTIC EXERCISES: CPT

## 2019-10-28 ENCOUNTER — HOSPITAL ENCOUNTER (OUTPATIENT)
Dept: PHYSICAL THERAPY | Facility: CLINIC | Age: 74
Setting detail: THERAPIES SERIES
Discharge: HOME | End: 2019-10-28
Payer: MEDICARE

## 2019-10-28 PROCEDURE — 97110 THERAPEUTIC EXERCISES: CPT

## 2019-10-28 PROCEDURE — 97018 PARAFFIN BATH THERAPY: CPT

## 2019-10-30 ENCOUNTER — HOSPITAL ENCOUNTER (OUTPATIENT)
Dept: PHYSICAL THERAPY | Facility: CLINIC | Age: 74
Setting detail: THERAPIES SERIES
Discharge: HOME | End: 2019-10-30
Payer: MEDICARE

## 2019-10-30 PROCEDURE — 97018 PARAFFIN BATH THERAPY: CPT

## 2019-10-30 PROCEDURE — 97110 THERAPEUTIC EXERCISES: CPT

## 2019-11-04 ENCOUNTER — APPOINTMENT (OUTPATIENT)
Dept: PHYSICAL THERAPY | Facility: CLINIC | Age: 74
End: 2019-11-04
Payer: MEDICARE

## 2019-11-05 ENCOUNTER — HOSPITAL ENCOUNTER (OUTPATIENT)
Dept: PHYSICAL THERAPY | Facility: CLINIC | Age: 74
Setting detail: THERAPIES SERIES
Discharge: HOME | End: 2019-11-05
Payer: MEDICARE

## 2019-11-06 ENCOUNTER — HOSPITAL ENCOUNTER (OUTPATIENT)
Dept: PHYSICAL THERAPY | Facility: CLINIC | Age: 74
Setting detail: THERAPIES SERIES
Discharge: HOME | End: 2019-11-06
Payer: MEDICARE

## 2019-11-06 PROCEDURE — 97110 THERAPEUTIC EXERCISES: CPT

## 2019-11-06 PROCEDURE — 97018 PARAFFIN BATH THERAPY: CPT

## 2019-11-08 ENCOUNTER — HOSPITAL ENCOUNTER (OUTPATIENT)
Dept: PHYSICAL THERAPY | Facility: CLINIC | Age: 74
Setting detail: THERAPIES SERIES
Discharge: HOME | End: 2019-11-08
Payer: MEDICARE

## 2019-11-08 PROCEDURE — 97018 PARAFFIN BATH THERAPY: CPT

## 2019-11-08 PROCEDURE — 97110 THERAPEUTIC EXERCISES: CPT

## 2019-11-11 ENCOUNTER — HOSPITAL ENCOUNTER (OUTPATIENT)
Dept: PHYSICAL THERAPY | Facility: CLINIC | Age: 74
Setting detail: THERAPIES SERIES
End: 2019-11-11
Payer: MEDICARE

## 2019-11-12 ENCOUNTER — HOSPITAL ENCOUNTER (OUTPATIENT)
Dept: PHYSICAL THERAPY | Facility: CLINIC | Age: 74
Setting detail: THERAPIES SERIES
Discharge: HOME | End: 2019-11-12
Payer: MEDICARE

## 2019-11-12 PROCEDURE — 97018 PARAFFIN BATH THERAPY: CPT

## 2019-11-12 PROCEDURE — 97110 THERAPEUTIC EXERCISES: CPT

## 2019-11-13 ENCOUNTER — HOSPITAL ENCOUNTER (OUTPATIENT)
Dept: PHYSICAL THERAPY | Facility: CLINIC | Age: 74
Setting detail: THERAPIES SERIES
Discharge: HOME | End: 2019-11-13
Payer: MEDICARE

## 2019-11-13 PROCEDURE — 97018 PARAFFIN BATH THERAPY: CPT

## 2019-11-13 PROCEDURE — 97110 THERAPEUTIC EXERCISES: CPT

## 2019-11-19 ENCOUNTER — HOSPITAL ENCOUNTER (OUTPATIENT)
Dept: PHYSICAL THERAPY | Facility: CLINIC | Age: 74
Setting detail: THERAPIES SERIES
Discharge: HOME | End: 2019-11-19
Payer: MEDICARE

## 2019-11-19 PROCEDURE — 97110 THERAPEUTIC EXERCISES: CPT

## 2019-11-19 PROCEDURE — 97018 PARAFFIN BATH THERAPY: CPT

## 2019-11-21 ENCOUNTER — TELEPHONE (OUTPATIENT)
Dept: NEUROLOGY | Age: 74
End: 2019-11-21

## 2019-11-22 ENCOUNTER — HOSPITAL ENCOUNTER (OUTPATIENT)
Dept: PHYSICAL THERAPY | Facility: CLINIC | Age: 74
Setting detail: THERAPIES SERIES
Discharge: HOME | End: 2019-11-22
Payer: MEDICARE

## 2019-11-22 PROCEDURE — 97018 PARAFFIN BATH THERAPY: CPT

## 2019-11-22 PROCEDURE — 97110 THERAPEUTIC EXERCISES: CPT

## 2019-11-26 ENCOUNTER — HOSPITAL ENCOUNTER (OUTPATIENT)
Dept: PHYSICAL THERAPY | Facility: CLINIC | Age: 74
Setting detail: THERAPIES SERIES
Discharge: HOME | End: 2019-11-26
Payer: MEDICARE

## 2019-11-26 PROCEDURE — 97018 PARAFFIN BATH THERAPY: CPT

## 2019-11-26 PROCEDURE — 97110 THERAPEUTIC EXERCISES: CPT

## 2019-12-06 ENCOUNTER — HOSPITAL ENCOUNTER (OUTPATIENT)
Age: 74
Setting detail: SPECIMEN
Discharge: HOME | End: 2019-12-06
Payer: MEDICARE

## 2019-12-06 DIAGNOSIS — E78.5 DYSLIPIDEMIA: ICD-10-CM

## 2019-12-07 LAB
CHOLESTEROL/HDL RATIO: 2.8
CHOLESTEROL: 133 MG/DL
HDLC SERPL-MCNC: 48 MG/DL
LDL CHOLESTEROL: 69 MG/DL (ref 0–130)
TRIGL SERPL-MCNC: 81 MG/DL
VLDLC SERPL CALC-MCNC: NORMAL MG/DL (ref 1–30)

## 2019-12-11 ENCOUNTER — OFFICE VISIT (OUTPATIENT)
Dept: NEUROLOGY | Age: 74
End: 2019-12-11
Payer: MEDICARE

## 2019-12-11 ENCOUNTER — HOSPITAL ENCOUNTER (OUTPATIENT)
Age: 74
Setting detail: SPECIMEN
Discharge: HOME | End: 2019-12-11
Payer: MEDICARE

## 2019-12-11 VITALS
BODY MASS INDEX: 23.56 KG/M2 | WEIGHT: 146.6 LBS | DIASTOLIC BLOOD PRESSURE: 82 MMHG | HEIGHT: 66 IN | SYSTOLIC BLOOD PRESSURE: 131 MMHG | HEART RATE: 67 BPM

## 2019-12-11 DIAGNOSIS — Z51.81 MEDICATION MONITORING ENCOUNTER: ICD-10-CM

## 2019-12-11 DIAGNOSIS — I63.81 ACUTE ISCHEMIC VERTEBROBASILAR ARTERY THALAMIC STROKE INVOLVING LEFT-SIDED VESSEL (HCC): Primary | ICD-10-CM

## 2019-12-11 DIAGNOSIS — I63.81 ACUTE ISCHEMIC VERTEBROBASILAR ARTERY THALAMIC STROKE INVOLVING LEFT-SIDED VESSEL (HCC): ICD-10-CM

## 2019-12-11 DIAGNOSIS — R20.9 ABNORMAL ARM SENSATION: ICD-10-CM

## 2019-12-11 DIAGNOSIS — G56.01 CARPAL TUNNEL SYNDROME OF RIGHT WRIST: ICD-10-CM

## 2019-12-11 DIAGNOSIS — R20.0 RIGHT SIDED NUMBNESS: ICD-10-CM

## 2019-12-11 LAB
ALT SERPL-CCNC: 48 U/L (ref 5–41)
AST SERPL-CCNC: 29 U/L
TOTAL CK: 71 U/L (ref 39–308)

## 2019-12-11 PROCEDURE — G8420 CALC BMI NORM PARAMETERS: HCPCS | Performed by: PSYCHIATRY & NEUROLOGY

## 2019-12-11 PROCEDURE — 1036F TOBACCO NON-USER: CPT | Performed by: PSYCHIATRY & NEUROLOGY

## 2019-12-11 PROCEDURE — 99214 OFFICE O/P EST MOD 30 MIN: CPT | Performed by: PSYCHIATRY & NEUROLOGY

## 2019-12-11 PROCEDURE — 3017F COLORECTAL CA SCREEN DOC REV: CPT | Performed by: PSYCHIATRY & NEUROLOGY

## 2019-12-11 PROCEDURE — G8598 ASA/ANTIPLAT THER USED: HCPCS | Performed by: PSYCHIATRY & NEUROLOGY

## 2019-12-11 PROCEDURE — 4040F PNEUMOC VAC/ADMIN/RCVD: CPT | Performed by: PSYCHIATRY & NEUROLOGY

## 2019-12-11 PROCEDURE — G8484 FLU IMMUNIZE NO ADMIN: HCPCS | Performed by: PSYCHIATRY & NEUROLOGY

## 2019-12-11 PROCEDURE — 1123F ACP DISCUSS/DSCN MKR DOCD: CPT | Performed by: PSYCHIATRY & NEUROLOGY

## 2019-12-11 PROCEDURE — G8427 DOCREV CUR MEDS BY ELIG CLIN: HCPCS | Performed by: PSYCHIATRY & NEUROLOGY

## 2019-12-11 RX ORDER — ASPIRIN 81 MG/1
81 TABLET ORAL DAILY
Qty: 90 TABLET | Refills: 3 | Status: SHIPPED | OUTPATIENT
Start: 2019-12-11 | End: 2020-06-17 | Stop reason: SDUPTHER

## 2019-12-11 RX ORDER — GABAPENTIN 100 MG/1
CAPSULE ORAL
Qty: 180 CAPSULE | Refills: 1 | Status: SHIPPED | OUTPATIENT
Start: 2019-12-11 | End: 2020-12-16 | Stop reason: ALTCHOICE

## 2019-12-11 RX ORDER — ROSUVASTATIN CALCIUM 10 MG/1
10 TABLET, COATED ORAL NIGHTLY
Qty: 30 TABLET | Refills: 3 | Status: SHIPPED | OUTPATIENT
Start: 2019-12-11 | End: 2020-04-06

## 2020-02-17 ENCOUNTER — OFFICE VISIT (OUTPATIENT)
Dept: PHYSICAL MEDICINE AND REHAB | Age: 75
End: 2020-02-17
Payer: MEDICARE

## 2020-02-17 VITALS
TEMPERATURE: 97.4 F | DIASTOLIC BLOOD PRESSURE: 75 MMHG | SYSTOLIC BLOOD PRESSURE: 114 MMHG | BODY MASS INDEX: 23.3 KG/M2 | WEIGHT: 145 LBS | HEIGHT: 66 IN | HEART RATE: 79 BPM

## 2020-02-17 PROCEDURE — 3017F COLORECTAL CA SCREEN DOC REV: CPT | Performed by: PHYSICAL MEDICINE & REHABILITATION

## 2020-02-17 PROCEDURE — 1036F TOBACCO NON-USER: CPT | Performed by: PHYSICAL MEDICINE & REHABILITATION

## 2020-02-17 PROCEDURE — 4040F PNEUMOC VAC/ADMIN/RCVD: CPT | Performed by: PHYSICAL MEDICINE & REHABILITATION

## 2020-02-17 PROCEDURE — G8427 DOCREV CUR MEDS BY ELIG CLIN: HCPCS | Performed by: PHYSICAL MEDICINE & REHABILITATION

## 2020-02-17 PROCEDURE — 99213 OFFICE O/P EST LOW 20 MIN: CPT | Performed by: PHYSICAL MEDICINE & REHABILITATION

## 2020-02-17 PROCEDURE — G8484 FLU IMMUNIZE NO ADMIN: HCPCS | Performed by: PHYSICAL MEDICINE & REHABILITATION

## 2020-02-17 PROCEDURE — 1123F ACP DISCUSS/DSCN MKR DOCD: CPT | Performed by: PHYSICAL MEDICINE & REHABILITATION

## 2020-02-17 PROCEDURE — G8420 CALC BMI NORM PARAMETERS: HCPCS | Performed by: PHYSICAL MEDICINE & REHABILITATION

## 2020-02-17 NOTE — PROGRESS NOTES
[facility]  [address] [place] [address] [address]  [address] [address]  Dept: [phone]  Dept Fax: [phone]    Outpatient Followup Note    Storm Dougherty, 69 y.o., male, presents for follow up c/o of Follow-up (Follow up from rehab)  . HPI:     HPI  Patient who sustained L thalamic ischemic CVA 8/22/19 and was treated at [facility] medically. He was discharged home and has completed outpatient therapy. He has returned to work and is reportedly doing well. Only remaining symptom is numbness/tingling/paresthesia of RUE, R face and RLE which is gradually improving. He is LHD. He notes the numbness is worse when he is fatigued and when he is cold. Stroke    Affected Side: right non dominant  Handedness: left handed  Therapy Status: Completed outpatient physical therapy. Performing home exercises. Dysphagia: Absent   Aphasia: None  Sleep:WNL  Daytime Focus/Attention:Intact  Bowel: Spontaneous  : Spontaneous  Mood: WNL  Support: spouse / significant other lives with him but she works in [employer]  Spasticity: None  Edema: None, but does have numbness/tingling persistent in RUE and RLE and R face. Heat alleviates numbness/tingling.   DME: None  Shoulder Pain: None     Past Medical History:   Diagnosis Date    Kidney stones     Numbness and tingling in right hand     Numbness and tingling of right side of face     Stroke [facility])       Past Surgical History:   Procedure Laterality Date    CHOLECYSTECTOMY      HERNIA REPAIR       Family History   Problem Relation Age of Onset    Stroke Mother     High Blood Pressure Mother     Other Mother         aneurysm    Cancer Father     Cancer Sister     Cancer Brother     Heart Disease Maternal Grandmother      Social History     Socioeconomic History    Marital status:      Spouse name: None    Number of children: None    Years of education: None    Highest education level: None   Occupational History    None   Social Needs    Financial resource strain: None    Food insecurity:     Worry: None     Inability: None    Transportation needs:     Medical: None     Non-medical: None   Tobacco Use    Smoking status: Never Smoker    Smokeless tobacco: Never Used   Substance and Sexual Activity    Alcohol use: Yes     Alcohol/week: 1.0 standard drinks     Types: 1 Cans of beer per week     Frequency: Monthly or less     Comment: rarely    Drug use: Never    Sexual activity: None   Lifestyle    Physical activity:     Days per week: None     Minutes per session: None    Stress: None   Relationships    Social connections:     Talks on phone: None     Gets together: None     Attends [employer] service: None     Active member of club or organization: None     Attends meetings of clubs or organizations: None     Relationship status: None    Intimate partner violence:     Fear of current or ex partner: None     Emotionally abused: None     Physically abused: None     Forced sexual activity: None   Other Topics Concern    None   Social History Narrative    None       Current Outpatient Medications   Medication Sig Dispense Refill    rosuvastatin (CRESTOR) 10 MG tablet Take 1 tablet by mouth nightly 30 tablet 3    aspirin 81 MG EC tablet Take 1 tablet by mouth daily 90 tablet 3    gabapentin (NEURONTIN) 100 MG capsule Take one cap twice daily (Patient not taking: Reported on 2/17/2020) 180 capsule 1    clopidogrel (PLAVIX) 75 MG tablet Take 1 tablet by mouth daily (Patient not taking: Reported on 12/11/2019) 20 tablet 0     No current facility-administered medications for this visit. Allergies   Allergen Reactions    Lipitor [Atorvastatin Calcium]      Muscle spasms      Pcn [Penicillins]     Sulfa Antibiotics        Subjective:      Review of Systems  Constitutional: Negative for fever, chills and unexpected weight change.    HENT: Negative for trouble The visualized portion of the orbits demonstrate no acute abnormality.       SINUSES: The visualized paranasal sinuses and mastoid air cells are well   aerated.       BONES/SOFT TISSUES: The bone marrow signal intensity appears normal. The soft   tissues demonstrate no acute abnormality.           Impression   Acute lacunar infarct within the left thalamus. Assessment:       Diagnosis Orders   1. Right sided numbness     2. History of lacunar cerebrovascular accident (CVA)          Plan:      Patient is doing well at home with home exercises and stroke prophylactic medications. Advised him to return if he notices a decline in his function related to R paresthesias and we can consider maintenance therapy at that time if indicated. Provided him with information on local stroke support group. No orders of the defined types were placed in this encounter. No orders of the defined types were placed in this encounter. Return if symptoms worsen or fail to improve. Electronically signedby Heinz Najjar, MD on 2/17/2020 at 4:14 PM.     Please note that this chartwas generated using voice recognition Dragon dictation software. Although everyeffort was made to ensure the accuracy of this automated transcription, some errorsin transcription may have occurred.

## 2020-03-11 ENCOUNTER — HOSPITAL ENCOUNTER (OUTPATIENT)
Age: 75
Setting detail: SPECIMEN
Discharge: HOME | End: 2020-03-11
Payer: MEDICARE

## 2020-03-11 LAB — ALT SERPL-CCNC: 40 U/L (ref 5–41)

## 2020-04-06 ENCOUNTER — TELEPHONE (OUTPATIENT)
Dept: NEUROLOGY | Age: 75
End: 2020-04-06

## 2020-04-06 RX ORDER — ROSUVASTATIN CALCIUM 10 MG/1
TABLET, COATED ORAL
Qty: 30 TABLET | Refills: 3 | Status: SHIPPED | OUTPATIENT
Start: 2020-04-06 | End: 2020-08-31 | Stop reason: SDUPTHER

## 2020-04-06 NOTE — TELEPHONE ENCOUNTER
Please let the patient know that     Lipid panel 12/6/2019: LDL 69, HDL 48, triglycerides 81, total cholesterol 133. Lipid panel 8/12/2019: , HDL 43, triglycerides 107, total cholesterol 187. Lipid panel is definitely improving; he should continue cholesterol medication if he is able to tolerate it okay.     Thank you.   -dr. Christie Conrad

## 2020-04-17 NOTE — TELEPHONE ENCOUNTER
Please let the patient know that rpt ALT from March was normal. No need to recheck.    Thank you.   -dr. Scott Screws

## 2020-06-17 ENCOUNTER — OFFICE VISIT (OUTPATIENT)
Dept: NEUROLOGY | Age: 75
End: 2020-06-17
Payer: MEDICARE

## 2020-06-17 VITALS
BODY MASS INDEX: 21.69 KG/M2 | WEIGHT: 135 LBS | SYSTOLIC BLOOD PRESSURE: 108 MMHG | DIASTOLIC BLOOD PRESSURE: 67 MMHG | HEIGHT: 66 IN | HEART RATE: 81 BPM

## 2020-06-17 PROCEDURE — 3017F COLORECTAL CA SCREEN DOC REV: CPT | Performed by: PSYCHIATRY & NEUROLOGY

## 2020-06-17 PROCEDURE — G8427 DOCREV CUR MEDS BY ELIG CLIN: HCPCS | Performed by: PSYCHIATRY & NEUROLOGY

## 2020-06-17 PROCEDURE — 4040F PNEUMOC VAC/ADMIN/RCVD: CPT | Performed by: PSYCHIATRY & NEUROLOGY

## 2020-06-17 PROCEDURE — 1036F TOBACCO NON-USER: CPT | Performed by: PSYCHIATRY & NEUROLOGY

## 2020-06-17 PROCEDURE — G8420 CALC BMI NORM PARAMETERS: HCPCS | Performed by: PSYCHIATRY & NEUROLOGY

## 2020-06-17 PROCEDURE — 1123F ACP DISCUSS/DSCN MKR DOCD: CPT | Performed by: PSYCHIATRY & NEUROLOGY

## 2020-06-17 PROCEDURE — 99214 OFFICE O/P EST MOD 30 MIN: CPT | Performed by: PSYCHIATRY & NEUROLOGY

## 2020-06-17 RX ORDER — ASPIRIN 81 MG/1
81 TABLET ORAL DAILY
Qty: 90 TABLET | Refills: 3 | Status: SHIPPED | OUTPATIENT
Start: 2020-06-17 | End: 2020-12-16 | Stop reason: SDUPTHER

## 2020-06-17 NOTE — LETTER
[facility]  [address] [facility] [address]  Phone: [phone]  Fax: [phone]    Janette Giraldo MD        2020     [address] [place] [address], [address] [place] [address]  [id number]    Patient: Oriana Abebe  MR Number: [id number]  YOB: 1945  Date of Visit: 2020    Dear Dr. Augustus Woods: Thank you for the request for consultation for Crescencio Landrum to me for the evaluation of ELISA HERZOG  Below are the relevant portions of my assessment and plan of care. NEUROLOGY FOLLOW-UP  Patient Name:  Oriana Abebe  :   [id number]  Clinic Visit Date: 2020    I saw Mr. Oriana Abebe in follow-up in the office today in continuation of neurologic care. He is a 69 y.o. Left handed  male with recent hosp (19) for left thalamic stroke. He comes to clinic stating that he has improvement in abnormal sensations in right hand. He has not been suffering from carpal tunnel symptomatology. He still has numbness involving fingers related to prior stroke. He has not used gabapentin as those are not bothersome. He was having nocturnal awakenings with dysesthesias at times. He used to get relief by shaking his hand at times. On occasion he used to have slightly painful sensations involving whole right side of the body. Those abnormal sensations are getting much better. He has been doing physical therapy and it has been helping for strength. He has not had any problems with clumsiness and slurred speech. He has been taking aspirin and rosuvastatin every day since discharge from the hospital and has not had any side effects from those medications. He has been tolerating Crestor well without any adverse effects. Re: recent hospitalization:  Briefly, this is a  69 y.o.  Left handed  male with no significant PMH and never on any meds was admitted on 2019 with c/o acute onset to following [facility] along with you.     Sincerely,        Marquita Ohara MD

## 2020-06-17 NOTE — PROGRESS NOTES
NEUROLOGY FOLLOW-UP  Patient Name:  Cong Tran  :   1945  Clinic Visit Date: 2020        REVIEW OF SYSTEMS  Constitutional Weight changes: present, Fevers : absent, Fatigue: absent; Any recent hospitalizations:  absent.    HEENT Ears: normal, Eyes: glasses, Visual disturbance: absent   Reespiratory Shortness of breath: absent, Cough: absent   Cardivascular Chest pain: absent, Leg swelling :absent   GI Constipation: absent, Diarrhea: absent, Swallowing change: absent    Urinary frequency: absent, Urinary urgency: absent, Urinary incontinence: absent   Musculoskeletal Neck pain: absent, Back pain: absent, Stiffness: absent, Muscle pain: absent, Joint pain: absent   Dermatological Hair loss: absent, Skin changes: absent   Neurological Memory loss: absent, Confusion: absent, Seizures: absent; Trouble walking or imbalance: absent, Dizziness: absent, Weakness: absent, Numbness absent, Tremor: absent, Spasm: absent, Speech difficulty: absent, Headache: absent, Light sensitivity: absent   Psychiatric Anxiety: absent, Depression  absent, Suicidal ideations absent   Hematologic Abnormal bleeding: absent, Anemia: absent, Clotting disorder: absent, Lymph gland changes: absent

## 2020-06-17 NOTE — PROGRESS NOTES
exam: Blood pressure 108/67, pulse 81, height 5' 6\" (1.676 m), weight 135 lb (61.2 kg). GENERAL  Appears comfortable and in no distress   HEENT  NC/ AT   NECK  Supple and no bruits heard   MENTAL STATUS:  Alert, oriented, intact memory, no confusion, normal speech, normal language, no hallucination or delusion; appropriate affect    CRANIAL NERVES: II     -      PERRLA, Fundi reveal intact venous pulsations; Visual fields intact to confrontation  III,IV,VI -  EOMs full, no afferent defect, no                      GILL, no ptosis  V     -     Impaired LT, PP on right face  VII    -     Normal facial symmetry  VIII   -     Intact hearing  IX,X -     Symmetrical palate  XI    -     Symmetrical shoulder shrug  XII   -     Midline tongue, no atrophy    MOTOR FUNCTION:  significant for good strength of grade 5/5 in bilateral proximal and distal muscle groups of both upper and lower extremities with normal bulk, normal tone and no involuntary movements, no tremor   SENSORY FUNCTION:  impaired LT, PP, Temp on right side of the body   CEREBELLAR FUNCTION:  Intact fine motor control over upper limbs   REFLEX FUNCTION:  Symmetric, no perverted reflex, no Babinski sign   STATION and GAIT  Normal station, normal gait; no ataxia noted       CT head 8/11/2019: No acute intracranial abnormality. CTA head and neck 8/11/2019: Unremarkable. MRI brain 8/11/2019: Acute lacunar ischemic infarct in left thalamus.     CBC:     Lab Results   Component Value Date    WBC 5.0 08/12/2019     08/12/2019      BMP:     Lab Results   Component Value Date     08/12/2019    K 3.9 08/12/2019    GLUCOSE 134 (H) 08/12/2019    CALCIUM 8.9 08/12/2019       No results found for: PHENYTOIN, PHENOBARB, VALPROATE, CBMZ    Lab Results   Component Value Date    CHOL 133 12/06/2019    LDLCHOLESTEROL 69 12/06/2019    HDL 48 12/06/2019    TRIG 81 12/06/2019    ALT 40 03/11/2020    AST 29 12/11/2019    INR 1.1 08/11/2019    LABA1C 6.5 (H) 08/12/2019               Impression and Plan: Mr. Nicola Whiteside is a 69 y.o. male with   Left thalamic ischemic stroke (8/11/19) with resultant right-sided sensory loss: Stable without any progression. We will continue aspirin and rosuvastatin nightly for secondary stroke prophylaxis. Will repeat lipid panel, AST, ALT, CPK for therapeutic monitoring. Right hand entrapment symptomatology: Discussed about right carpal tunnel syndrome; symptoms improved remarkably; does not want any further testing such as NCS/EMG of Rt UE     Cerebrovascular disease counseling: discussed about signs and symptoms that would suggest TIA and or CVA and should these develop in the interim; the patient was asked to go to the nearest ER. Otherwise I anticipate seeing the patient in follow up in the clinic. Also discussed about continuing meds to prevent stroke. These include antiplatelet meds and statin meds as outlined above for stroke prophylaxis. Follow-up in 6 months. Please note that portions of this note were completed with a voice recognition program.  Although every effort was made to ensure the accuracy of this  automated transcription, some errors in transcription may have occurred, occasionally words are mis-transcribed.

## 2020-08-31 NOTE — TELEPHONE ENCOUNTER
Incoming fax requesting refill of Crestor.       Medication active on med list yes      Date of last fill: 4/6/20  verified on 8/31/2020   verified by [facility] LPN      Date of last appointment 6/17/20    Next Visit Date:  12/16/20

## 2020-08-31 NOTE — TELEPHONE ENCOUNTER
Andreina  Can you please make sure that he is doing ok with Rosuvastatin because he was allergic to Atorvastatin.      Thank you.   -dr. Chico Peter

## 2020-09-01 RX ORDER — ROSUVASTATIN CALCIUM 10 MG/1
TABLET, COATED ORAL
Qty: 90 TABLET | Refills: 1 | Status: SHIPPED
Start: 2020-09-01 | End: 2020-12-16 | Stop reason: SINTOL

## 2020-09-04 RX ORDER — NIACIN 500 MG/1
500 TABLET, EXTENDED RELEASE ORAL NIGHTLY
Qty: 30 TABLET | Refills: 0 | Status: SHIPPED | OUTPATIENT
Start: 2020-09-04 | End: 2020-11-11

## 2020-11-09 NOTE — TELEPHONE ENCOUNTER
Pharmacy requesting a  refill of Niaspan 500mg.       Medication active on med list yes      Date of last prescription 09/04/2020  with 0 refills verified on 11/09/2020    verified by MARCOS MARTINEZ      Date of last appointment 06/07/2020    Next Visit Date:  Visit date not found

## 2020-11-11 RX ORDER — NIACIN 500 MG/1
TABLET, EXTENDED RELEASE ORAL
Qty: 30 TABLET | Refills: 0 | Status: SHIPPED | OUTPATIENT
Start: 2020-11-11 | End: 2021-01-25

## 2020-12-16 ENCOUNTER — OFFICE VISIT (OUTPATIENT)
Dept: NEUROLOGY | Age: 75
End: 2020-12-16
Payer: MEDICARE

## 2020-12-16 VITALS
HEART RATE: 76 BPM | WEIGHT: 133 LBS | HEIGHT: 66 IN | DIASTOLIC BLOOD PRESSURE: 75 MMHG | SYSTOLIC BLOOD PRESSURE: 124 MMHG | TEMPERATURE: 98.7 F | BODY MASS INDEX: 21.38 KG/M2

## 2020-12-16 PROCEDURE — 1123F ACP DISCUSS/DSCN MKR DOCD: CPT | Performed by: PSYCHIATRY & NEUROLOGY

## 2020-12-16 PROCEDURE — G8484 FLU IMMUNIZE NO ADMIN: HCPCS | Performed by: PSYCHIATRY & NEUROLOGY

## 2020-12-16 PROCEDURE — 4040F PNEUMOC VAC/ADMIN/RCVD: CPT | Performed by: PSYCHIATRY & NEUROLOGY

## 2020-12-16 PROCEDURE — 3017F COLORECTAL CA SCREEN DOC REV: CPT | Performed by: PSYCHIATRY & NEUROLOGY

## 2020-12-16 PROCEDURE — 99214 OFFICE O/P EST MOD 30 MIN: CPT | Performed by: PSYCHIATRY & NEUROLOGY

## 2020-12-16 PROCEDURE — G8427 DOCREV CUR MEDS BY ELIG CLIN: HCPCS | Performed by: PSYCHIATRY & NEUROLOGY

## 2020-12-16 PROCEDURE — 1036F TOBACCO NON-USER: CPT | Performed by: PSYCHIATRY & NEUROLOGY

## 2020-12-16 PROCEDURE — G8420 CALC BMI NORM PARAMETERS: HCPCS | Performed by: PSYCHIATRY & NEUROLOGY

## 2020-12-16 RX ORDER — ASPIRIN 81 MG/1
81 TABLET ORAL DAILY
Qty: 90 TABLET | Refills: 3 | Status: SHIPPED | OUTPATIENT
Start: 2020-12-16 | End: 2021-10-27 | Stop reason: SDUPTHER

## 2020-12-16 NOTE — PROGRESS NOTES
NEUROLOGY FOLLOW-UP  Patient Name:  Faith Christine  :   [birth year]  Clinic Visit Date: 2020    I saw Mr. Faith Christine in follow-up in the office today in continuation of neurologic care. He is a 70 y.o. Left handed  male with recent hosp (19) for left thalamic stroke. He comes to clinic stating that he has improvement in sensory loss stating \"I guess my nerves in Rt hand are coming back\". He also denied nocturnal paresthesias and dysesthesias. He has been having tremors in right upper extremity predominantly during emotional state only. He occasionally has tremors occurring resting state. He denies tremors with exertion. Denies tremors with handwriting. Denies spillage of food stuff onto the clothes. He called our office in August because of muscle cramps. He was advised to stop rosuvastatin and muscle cramps improved immediately. Then he was advised to take niacin and aspirin. He has not had any cholesterol blood work since August this year. Re: prior hospitalization:  Briefly, this is a  69 y.o. Left handed  male with no significant PMH and never on any meds was admitted on 2019 with c/o acute onset of right facial numbness and tingling progressing to right upper extremity and right lower extremity associated with clumsiness along with slurred speech since 11:30 AM.  It occurred when he was pushing his boat. His wife brought him to ED at [facility] and subsequently transferred to [facility] for further management. He had CT head and CTA head and neck at [facility]. ED and those were unremarkable. Stroke team was contacted. TPA was withheld due to improving symptomatology. Patient was loaded with aspirin and Plavix and then transferred to [place] for further management. Since admitted he has had slight improvement in symptoms. He still has ongoing numbness involving right side of the body along with coordination difficulties.   He had bedside swallow and he passed it. Presently denies headaches, dizziness and vision changes. His speech difficulties had been improving. Denies dysphagia. Denies vertigo. Vitals on admit: 142/91, 71/min, 16/min, 98.2 °F.    Review of systems done by staff reviewed and pertinent positives include Weakness, numbness, tremors, etc.       Current Outpatient Medications on File Prior to Visit   Medication Sig Dispense Refill    niacin (NIASPAN) 500 MG extended release tablet TAKE 1 TABLET EVERY NIGHT 30 tablet 0    aspirin 81 MG EC tablet Take 1 tablet by mouth daily 90 tablet 3    rosuvastatin (CRESTOR) 10 MG tablet TAKE ONE TABLET BY MOUTH ONE TIME DAILY at night (Patient not taking: Reported on 12/16/2020) 90 tablet 1    gabapentin (NEURONTIN) 100 MG capsule Take one cap twice daily (Patient not taking: Reported on 2/17/2020) 180 capsule 1    clopidogrel (PLAVIX) 75 MG tablet Take 1 tablet by mouth daily (Patient not taking: Reported on 12/11/2019) 20 tablet 0     No current facility-administered medications on file prior to visit. Allergies: Codie Graf is allergic to lipitor [atorvastatin calcium]; other; crestor [rosuvastatin]; pcn [penicillins]; and sulfa antibiotics. Past Medical History:   Diagnosis Date    Kidney stones     Numbness and tingling in right hand     Numbness and tingling of right side of face     Stroke [facility])        Past Surgical History:   Procedure Laterality Date    CHOLECYSTECTOMY      HERNIA REPAIR       Social History: Codie Graf  reports that he has never smoked. He has never used smokeless tobacco. He reports current alcohol use of about 1.0 standard drinks of alcohol per week. He reports that he does not use drugs.     Family History   Problem Relation Age of Onset    Stroke Mother     High Blood Pressure Mother     Other Mother         aneurysm    Cancer Father     Cancer Sister     Cancer Brother     Heart Disease Maternal Grandmother        On exam: Blood pressure 12/11/2019    INR 1.1 08/11/2019    LABA1C 6.5 (H) 08/12/2019               Impression and Plan: Mr. Darrell Favre is a 70 y.o. male with   Left thalamic ischemic stroke (8/11/19) with residual right-sided sensory impairment; improving sensory loss. Has not had any recurrence of signs/symptoms to indicate TIA/CVA. We will continue aspirin and Niacin. Again advised that he has to take niacin 500 mg with the evening meal; can be taken with aspirin 81 mg half hour prior to the niacin medication to increase the tolerability. Presently denies any adverse effects such as flushing, itching and paresthesias. Statin intolerance: hx of Atorvastatin intolerance; hx of Rosuvastatin nightly for secondary stroke prophylaxis. Will repeat lipid panel for therapeutic monitoring. Tremors in right upper extremity occurring intermittently predominantly during resting state; question early parkinsonism. Differential diagnoses of tremors discussed with the patient. Presently patient is not bothered by these tremors. We will continue monitoring. Right hand entrapment symptomatology: Discussed about right carpal tunnel syndrome; symptoms improved remarkably. Cerebrovascular disease counseling: discussed about signs and symptoms that would suggest TIA and or CVA and should these develop in the interim; the patient was asked to go to the nearest ER. Otherwise I anticipate seeing the patient in follow up in the clinic. Also discussed about continuing meds to prevent stroke. These include antiplatelet meds and statin meds as outlined above for stroke prophylaxis. Follow-up in 6 months. Please note that portions of this note were completed with a voice recognition program.  Although every effort was made to ensure the accuracy of this automated transcription, some errors in transcription may have occurred; occasionally words are mis-transcribed. Thank you for understanding.

## 2020-12-16 NOTE — PROGRESS NOTES
All items selected indicate a positive finding. Those items not selected are negative.   Constitutional [] Weight loss/gain   [] Fatigue  [] Fever/Chills   HEENT [] Hearing Loss  [] Visual Disturbance  [] Tinnitus  [] Eye pain   Respiratory [] Shortness of Breath  [] Cough  [] Snoring   Cardiovascular [] Chest Pain  [] Palpitations  [] Lightheaded   GI [] Constipation  [] Diarrhea  [] Swallowing change  [] Nausea/vomiting    [] Urinary Frequency  [] Urinary Urgency   Musculoskeletal [] Neck pain  [] Back pain  [] Muscle pain  [] Restless legs   Dermatologic [] Skin changes   Neurologic [] Memory loss/confusion  [] Seizures  [] Trouble walking or imbalance  [] Dizziness  [] Sleep disturbance  [x] Weakness  [x] Numbness  [x] Tremors  [] Speech Difficulty  [] Headaches  [] Light Sensitivity  [] Sound Sensitivity   Endocrinology []Excessive thirst  []Excessive hunger   Psychiatric [] Anxiety/Depression  [] Hallucination   Allergy/immunology []Hives/environmental allergies   Hematologic/lymph [] Abnormal bleeding  [] Abnormal bruising

## 2020-12-17 ENCOUNTER — HOSPITAL ENCOUNTER (OUTPATIENT)
Age: 75
Setting detail: SPECIMEN
Discharge: HOME | End: 2020-12-17
Payer: MEDICARE

## 2020-12-17 LAB
CHOLESTEROL/HDL RATIO: 3
CHOLESTEROL: 204 MG/DL
HDLC SERPL-MCNC: 68 MG/DL
LDL CHOLESTEROL: 121 MG/DL (ref 0–130)
TRIGL SERPL-MCNC: 74 MG/DL
VLDLC SERPL CALC-MCNC: ABNORMAL MG/DL (ref 1–30)

## 2021-01-22 DIAGNOSIS — E78.5 DYSLIPIDEMIA: ICD-10-CM

## 2021-01-22 NOTE — TELEPHONE ENCOUNTER
Pharmacy requesting a  refill of niaspan 500mg.       Medication active on med list yes      Date of last prescription 11/11/2020  with 0 refills verified on 01/22/2021    verified by MARCOS MARTINEZ      Date of last appointment 12/16/2020    Next Visit Date:  Visit date not found

## 2021-01-25 RX ORDER — NIACIN 500 MG/1
TABLET, EXTENDED RELEASE ORAL
Qty: 30 TABLET | Refills: 0 | Status: SHIPPED | OUTPATIENT
Start: 2021-01-25 | End: 2021-04-13

## 2021-04-12 DIAGNOSIS — E78.5 DYSLIPIDEMIA: ICD-10-CM

## 2021-04-13 NOTE — TELEPHONE ENCOUNTER
Pharmacy requesting refill of Niaspan.       Medication active on med list: yes      Date of last fill: 1/25/21 for #30 NR  verified on 4/13/2021    verified by Renuka Lockhart LPN      Date of last appointment: 12/16/20    Next Visit Date:  Visit date not found

## 2021-04-14 RX ORDER — NIACIN 500 MG/1
500 TABLET, EXTENDED RELEASE ORAL NIGHTLY
Qty: 30 TABLET | Refills: 0 | Status: SHIPPED | OUTPATIENT
Start: 2021-04-14 | End: 2021-08-06 | Stop reason: SDUPTHER

## 2021-06-25 ENCOUNTER — TELEPHONE (OUTPATIENT)
Dept: NEUROLOGY | Age: 76
End: 2021-06-25

## 2021-06-25 DIAGNOSIS — E78.5 DYSLIPIDEMIA: Primary | ICD-10-CM

## 2021-06-25 NOTE — TELEPHONE ENCOUNTER
Please let the patient know that I will put in a request for lipid panel, fasting, sample to be done in 2-3 wks before next visit.   Thank you.   -dr. Adeline Rooney

## 2021-06-25 NOTE — TELEPHONE ENCOUNTER
Informed patient of lipid panel to be done 2-3 weeks prior to next visit. Lab order printed and mailed to patient.

## 2021-06-25 NOTE — TELEPHONE ENCOUNTER
Brad Huffman is scheduled for f/u 10/27/21. He wants to know if you would like him to have any blood work done before that visit. Please advise.

## 2021-08-06 DIAGNOSIS — E78.5 DYSLIPIDEMIA: ICD-10-CM

## 2021-08-06 RX ORDER — NIACIN 500 MG/1
500 TABLET, EXTENDED RELEASE ORAL NIGHTLY
Qty: 30 TABLET | Refills: 2 | Status: SHIPPED | OUTPATIENT
Start: 2021-08-06 | End: 2021-10-27 | Stop reason: SDUPTHER

## 2021-08-06 NOTE — TELEPHONE ENCOUNTER
Patient  requesting refill of Niacin.       Medication active on med list yes      Date of last fill: 4/14/21  with 0 refills verified on 8/6/21  verified by MARK HENDRIX      Date of last appointment 12/16/2020    Next Visit Date:  10/27/21     Patient would like refills until he comes in in Oct.

## 2021-10-07 ENCOUNTER — HOSPITAL ENCOUNTER (OUTPATIENT)
Age: 76
Setting detail: SPECIMEN
Discharge: HOME | End: 2021-10-07
Payer: MEDICARE

## 2021-10-07 DIAGNOSIS — E78.5 DYSLIPIDEMIA: ICD-10-CM

## 2021-10-07 LAB
CHOLESTEROL/HDL RATIO: 3.1
CHOLESTEROL: 189 MG/DL
HDLC SERPL-MCNC: 61 MG/DL
LDL CHOLESTEROL: 115 MG/DL (ref 0–130)
TRIGL SERPL-MCNC: 63 MG/DL
VLDLC SERPL CALC-MCNC: NORMAL MG/DL (ref 1–30)

## 2021-10-27 ENCOUNTER — OFFICE VISIT (OUTPATIENT)
Dept: NEUROLOGY | Age: 76
End: 2021-10-27
Payer: MEDICARE

## 2021-10-27 VITALS
RESPIRATION RATE: 16 BRPM | HEART RATE: 71 BPM | WEIGHT: 136.4 LBS | SYSTOLIC BLOOD PRESSURE: 121 MMHG | DIASTOLIC BLOOD PRESSURE: 70 MMHG | HEIGHT: 66 IN | BODY MASS INDEX: 21.92 KG/M2

## 2021-10-27 DIAGNOSIS — R20.0 RIGHT SIDED NUMBNESS: ICD-10-CM

## 2021-10-27 DIAGNOSIS — R25.1 TREMOR OF RIGHT HAND: ICD-10-CM

## 2021-10-27 DIAGNOSIS — E78.5 HYPERLIPIDEMIA, UNSPECIFIED HYPERLIPIDEMIA TYPE: ICD-10-CM

## 2021-10-27 DIAGNOSIS — I63.432 CEREBROVASCULAR ACCIDENT (CVA) DUE TO EMBOLISM OF LEFT POSTERIOR CEREBRAL ARTERY (HCC): Primary | ICD-10-CM

## 2021-10-27 DIAGNOSIS — Z78.9 STATIN INTOLERANCE: ICD-10-CM

## 2021-10-27 DIAGNOSIS — E78.5 DYSLIPIDEMIA: ICD-10-CM

## 2021-10-27 PROCEDURE — 1036F TOBACCO NON-USER: CPT | Performed by: PSYCHIATRY & NEUROLOGY

## 2021-10-27 PROCEDURE — 3017F COLORECTAL CA SCREEN DOC REV: CPT | Performed by: PSYCHIATRY & NEUROLOGY

## 2021-10-27 PROCEDURE — G8427 DOCREV CUR MEDS BY ELIG CLIN: HCPCS | Performed by: PSYCHIATRY & NEUROLOGY

## 2021-10-27 PROCEDURE — G8484 FLU IMMUNIZE NO ADMIN: HCPCS | Performed by: PSYCHIATRY & NEUROLOGY

## 2021-10-27 PROCEDURE — 4040F PNEUMOC VAC/ADMIN/RCVD: CPT | Performed by: PSYCHIATRY & NEUROLOGY

## 2021-10-27 PROCEDURE — G8420 CALC BMI NORM PARAMETERS: HCPCS | Performed by: PSYCHIATRY & NEUROLOGY

## 2021-10-27 PROCEDURE — 1123F ACP DISCUSS/DSCN MKR DOCD: CPT | Performed by: PSYCHIATRY & NEUROLOGY

## 2021-10-27 PROCEDURE — 99214 OFFICE O/P EST MOD 30 MIN: CPT | Performed by: PSYCHIATRY & NEUROLOGY

## 2021-10-27 RX ORDER — NIACIN 500 MG/1
500 TABLET, EXTENDED RELEASE ORAL NIGHTLY
Qty: 30 TABLET | Refills: 6 | Status: SHIPPED | OUTPATIENT
Start: 2021-10-27

## 2021-10-27 RX ORDER — ASPIRIN 81 MG/1
81 TABLET ORAL DAILY
Qty: 90 TABLET | Refills: 3 | Status: SHIPPED | OUTPATIENT
Start: 2021-10-27

## 2021-10-27 NOTE — PROGRESS NOTES
NEUROLOGY FOLLOW-UP  Patient Name:  Terri Narayan  :   [id number]  Clinic Visit Date: 10/27/2021  LOV: 2020      Dear Dr. Vineet Brown MD       I saw Mr. Terri Narayan in follow-up in the office today in continuation of neurologic care. As you know,  He is a 78 y.o. Left handed  male with recent hosp (19) for left thalamic stroke. He comes to clinic stating that he is numbness in right upper extremity has been improving. He still has occasional sensory paresthesias/dysesthesias but denied associated weakness. He has been having tremors in right upper extremity predominantly during emotional state only. He occasionally has tremors occurring resting state. He denies tremors with exertion. Denies tremors with handwriting. Denies spillage of food stuff onto the clothes. He has tried simvastatin, atorvastatin, rosuvastatin and could not tolerate any of those meds. He has had severe myalgias and could not tolerate those. He has been on niacin with some improvement in his lipid panel. Re: prior hospitalization:  Briefly, this is a  69 y.o. Left handed  male with no significant PMH and never on any meds was admitted on 2019 with c/o acute onset of right facial numbness and tingling progressing to right upper extremity and right lower extremity associated with clumsiness along with slurred speech since 11:30 AM.  It occurred when he was pushing his boat. His wife brought him to ED at [facility] and subsequently transferred to [facility] for further management. He had CT head and CTA head and neck at [facility] ED and those were unremarkable. Stroke team was contacted. TPA was withheld due to improving symptomatology. Patient was loaded with aspirin and Plavix and then [place] [facility] [place]. Since admitted he has had slight improvement in symptoms.   He still has ongoing numbness involving right side of the body along with coordination difficulties. He had bedside swallow and he passed it. Presently denies headaches, dizziness and vision changes. His speech difficulties had been improving. Denies dysphagia. Denies vertigo. Vitals on admit: 142/91, 71/min, 16/min, 98.2 °F.    Review of systems done by staff reviewed and pertinent positives include Weakness, numbness, tremors, etc.       Current Outpatient Medications on File Prior to Visit   Medication Sig Dispense Refill    niacin (NIASPAN) 500 MG extended release tablet Take 1 tablet by mouth nightly - Patient needs to call for appt 30 tablet 2    aspirin 81 MG EC tablet Take 1 tablet by mouth daily 90 tablet 3     No current facility-administered medications on file prior to visit. Allergies: Amira Ruiz is allergic to lipitor [atorvastatin calcium], other, crestor [rosuvastatin], pcn [penicillins], and sulfa antibiotics. Past Medical History:   Diagnosis Date    Kidney stones     Numbness and tingling in right hand     Numbness and tingling of right side of face     Stroke [facility])        Past Surgical History:   Procedure Laterality Date    CHOLECYSTECTOMY      HERNIA REPAIR      SKIN BIOPSY  09/2021    basal cell skin cancer removal     Social History: Amira Ruiz  reports that he has never smoked. He has never used smokeless tobacco. He reports current alcohol use of about 1.0 standard drinks of alcohol per week. He reports that he does not use drugs. Family History   Problem Relation Age of Onset    Stroke Mother     High Blood Pressure Mother     Other Mother         aneurysm    Cancer Father     Cancer Sister     Cancer Brother     Heart Disease Maternal Grandmother        On exam: Blood pressure 121/70, pulse 71, resp. rate 16, height 5' 6\" (1.676 m), weight 136 lb 6.4 oz (61.9 kg).     GENERAL  Appears comfortable and in no distress   HEENT  NC/ AT   NECK  Supple and no bruits heard   MENTAL STATUS:  Alert, oriented, intact memory, no confusion, normal speech, normal language, no hallucination or delusion; appropriate affect    CRANIAL NERVES: II     -      PERRLA, Fundi reveal intact venous pulsations; Visual fields intact to confrontation  III,IV,VI -  EOMs full, no afferent defect, no                      GILL, no ptosis  V     -     Impaired LT, PP on right face  VII    -     Normal facial symmetry  VIII   -     Intact hearing  IX,X -     Symmetrical palate  XI    -     Symmetrical shoulder shrug  XII   -     Midline tongue, no atrophy    MOTOR FUNCTION:  significant for good strength of grade 5/5 in bilateral proximal and distal muscle groups of both upper and lower extremities with normal bulk, normal tone and postural tremors of rt UE; no resting tremor   SENSORY FUNCTION:  impaired LT, PP, Temp on right side of the body   CEREBELLAR FUNCTION:  Intact fine motor control over upper limbs   REFLEX FUNCTION:  Symmetric, no perverted reflex, no Babinski sign   STATION and GAIT  Normal station, normal gait; no ataxia noted       CT head 8/11/2019: No acute intracranial abnormality. CTA head and neck 8/11/2019: Unremarkable. MRI brain 8/11/2019: Acute lacunar ischemic infarct in left thalamus.       CBC:     Lab Results   Component Value Date    WBC 5.0 08/12/2019     08/12/2019      BMP:     Lab Results   Component Value Date     08/12/2019    K 3.9 08/12/2019    GLUCOSE 134 (H) 08/12/2019    CALCIUM 8.9 08/12/2019       No results found for: PHENYTOIN, PHENOBARB, VALPROATE, CBMZ    Lab Results   Component Value Date    CHOL 189 10/07/2021    LDLCHOLESTEROL 115 10/07/2021    HDL 61 10/07/2021    TRIG 63 10/07/2021    ALT 40 03/11/2020    AST 29 12/11/2019    INR 1.1 08/11/2019    LABA1C 6.5 (H) 08/12/2019       Lab Results   Component Value Date    CHOL 189 10/07/2021    CHOL 204 (H) 12/17/2020    CHOL 133 12/06/2019     Lab Results   Component Value Date    TRIG 63 10/07/2021    TRIG 74 12/17/2020    TRIG 81 12/06/2019     Lab Results   Component Value Date    HDL 61 10/07/2021    HDL 68 12/17/2020    HDL 48 12/06/2019     Lab Results   Component Value Date    LDLCHOLESTEROL 115 10/07/2021    LDLCHOLESTEROL 121 12/17/2020    LDLCHOLESTEROL 69 12/06/2019     Lab Results   Component Value Date    VLDL NOT REPORTED 10/07/2021    VLDL NOT REPORTED 12/17/2020    VLDL NOT REPORTED 12/06/2019     Lab Results   Component Value Date    CHOLHDLRATIO 3.1 10/07/2021    CHOLHDLRATIO 3.0 12/17/2020    CHOLHDLRATIO 2.8 12/06/2019                 Impression and Plan: Mr. Mio Sherman is a 78 y.o. male with   · Left thalamic stroke (08/2019)  · Statin intolerance  · Tremors in right upper extremity  · Occasional RUE entrapment neuropathy symptomatology    Statin intolerance; tried pravastatin, atorvastatin, simvastatin and all of those meds caused severe myalgias. Discussion/counseling done about evolocumab 140 mg sq q2wk; he wants to try lifestyle changes such as avoiding fried foods/French fries, etc.  He wants to repeat lipid panel prior to considering more meds for lipid lowering. Will repeat lipid panel in 3 months. Left thalamic ischemic stroke (8/11/19) with residual right-sided sensory impairment; improved sensory loss. Hasn't had any recurrence of signs/symptoms to indicate TIA/CVA. Will continue aspirin and Niacin presently. Again advised that he has to take niacin 500 mg with the evening meal; can be taken with aspirin 81 mg half hour prior to the niacin medication to increase the tolerability. Presently denies any adverse effects such as flushing, itching and paresthesias. Has been walking/ biking every day. Tremors in right upper extremity occurring intermittently predominantly during resting state; question early parkinsonism. Differential diagnoses of tremors discussed with the patient. Presently patient is not bothered by these tremors. We will continue monitoring.     Right hand entrapment symptomatology: Discussed about right carpal tunnel syndrome; symptoms improved remarkably. Cerebrovascular disease counseling: discussed about signs and symptoms that would suggest TIA and or CVA and should these develop in the interim; the patient was asked to go to the nearest ER. Otherwise I anticipate seeing the patient in follow up in the clinic. Also discussed about continuing meds to prevent stroke. These include antiplatelet meds and statin meds as outlined above for stroke prophylaxis. Follow-up in 3 months.

## 2022-12-16 ENCOUNTER — OFFICE VISIT (OUTPATIENT)
Dept: NEUROLOGY | Age: 77
End: 2022-12-16
Payer: MEDICARE

## 2022-12-16 VITALS
BODY MASS INDEX: 21.05 KG/M2 | DIASTOLIC BLOOD PRESSURE: 69 MMHG | HEIGHT: 66 IN | SYSTOLIC BLOOD PRESSURE: 112 MMHG | HEART RATE: 79 BPM | WEIGHT: 131 LBS

## 2022-12-16 DIAGNOSIS — E78.5 DYSLIPIDEMIA: ICD-10-CM

## 2022-12-16 DIAGNOSIS — I63.81 THALAMIC INFARCTION (HCC): Primary | ICD-10-CM

## 2022-12-16 PROCEDURE — 99214 OFFICE O/P EST MOD 30 MIN: CPT | Performed by: PSYCHIATRY & NEUROLOGY

## 2022-12-16 PROCEDURE — G8484 FLU IMMUNIZE NO ADMIN: HCPCS | Performed by: PSYCHIATRY & NEUROLOGY

## 2022-12-16 PROCEDURE — 3017F COLORECTAL CA SCREEN DOC REV: CPT | Performed by: PSYCHIATRY & NEUROLOGY

## 2022-12-16 PROCEDURE — G8420 CALC BMI NORM PARAMETERS: HCPCS | Performed by: PSYCHIATRY & NEUROLOGY

## 2022-12-16 PROCEDURE — G8427 DOCREV CUR MEDS BY ELIG CLIN: HCPCS | Performed by: PSYCHIATRY & NEUROLOGY

## 2022-12-16 PROCEDURE — 1036F TOBACCO NON-USER: CPT | Performed by: PSYCHIATRY & NEUROLOGY

## 2022-12-16 PROCEDURE — 1123F ACP DISCUSS/DSCN MKR DOCD: CPT | Performed by: PSYCHIATRY & NEUROLOGY

## 2022-12-16 RX ORDER — NIACIN 500 MG/1
500 TABLET, EXTENDED RELEASE ORAL NIGHTLY
Qty: 90 TABLET | Refills: 1 | Status: SHIPPED | OUTPATIENT
Start: 2022-12-16

## 2022-12-16 NOTE — TELEPHONE ENCOUNTER
Pharmacy requesting refill of niacin (NIASPAN) 500 MG extended release tablet      Medication active on med list yes      Date of last Rx: 10/27/2021 with 6 refills          verified by LUCINA BRIAN      Date of last appointment 12/16/2022    Next Visit Date:  Visit date not found

## 2022-12-16 NOTE — PROGRESS NOTES
[address]  [facility], [address] [place] [address], [address] [place] [address]  Ph: [phone] or [phone]  FAX: [phone]    Chief Complaint:      Dear Puneet Rider MD     I had the pleasure of seeing your patient today in neurology consultation for his symptoms. As you would recall Jess Babin is a 78 y.o. male, previously followed by Dr. Meaghan Matson. Patient was admitted to [address] [place] [address] on 8/11/2019 for left thalamic stroke. Following CVA, patient reported numbness in the right upper extremity, though this has since improved. At last visit with Dr. Meaghan Matson, the patient reported occasional sensory paresthesias as well as tremors in the right upper extremity, provoked with weather changes and emotional distress. Patient currently does office work, he notes lack of sensation in his distal fingertips causes difficulty typing at work. He is left hand dominant. Patient previously unable to tolerate statin therapy.      Past Medical History:   Diagnosis Date    Kidney stones     Numbness and tingling in right hand     Numbness and tingling of right side of face     Stroke [facility])      Past Surgical History:   Procedure Laterality Date    CHOLECYSTECTOMY      HERNIA REPAIR      SKIN BIOPSY  09/2021    basal cell skin cancer removal     Allergies   Allergen Reactions    Lipitor [Atorvastatin Calcium]      Muscle spasms      Other Rash     amphicillin    Crestor [Rosuvastatin]     Pcn [Penicillins]     Sulfa Antibiotics      Family History   Problem Relation Age of Onset    Stroke Mother     High Blood Pressure Mother     Other Mother         aneurysm    Cancer Father     Cancer Sister     Cancer Brother     Heart Disease Maternal Grandmother       Social History     Socioeconomic History    Marital status:      Spouse name: Not on file    Number of children: Not on file    Years of education: Not on file    Highest education level: Not on file   Occupational History    Not on file   Tobacco Use    Smoking status: Never    Smokeless tobacco: Never   Vaping Use    Vaping Use: Never used   Substance and Sexual Activity    Alcohol use: Not Currently     Alcohol/week: 1.0 standard drink     Types: 1 Cans of beer per week     Comment: rarely    Drug use: Never    Sexual activity: Not Currently     Partners: Female     Comment: My wife   Other Topics Concern    Not on file   Social History Narrative    Not on file     Social Determinants of Health     Financial Resource Strain: Not on file   Food Insecurity: Not on file   Transportation Needs: Not on file   Physical Activity: Not on file   Stress: Not on file   Social Connections: Not on file   Intimate Partner Violence: Not on file   Housing Stability: Not on file      HEENT [] Hearing Loss  [] Visual Disturbance  [] Tinnitus  [] Eye pain   Respiratory [] Shortness of Breath  [] Cough  [] Snoring   Cardiovascular [] Chest Pain  [] Palpitations  [] Lightheaded   GI [] Constipation  [] Diarrhea  [] Swallowing change  [] Nausea/vomiting    [] Urinary Frequency  [] Urinary Urgency   Musculoskeletal [] Neck pain  [] Back pain  [] Muscle pain  [] Restless legs   Dermatologic [] Skin changes   Neurologic [] Memory loss/confusion  [] Seizures  [] Trouble walking or imbalance  [] Dizziness  [] Sleep disturbance  [] Weakness  [x] Numbness  [x] Tremors  [] Speech Difficulty  [] Headaches  [] Light Sensitivity  [] Sound Sensitivity   Endocrinology []Excessive thirst  []Excessive hunger   Psychiatric [] Anxiety/Depression  [] Hallucination   Allergy/immunology []Hives/environmental allergies   Hematologic/lymph [] Abnormal bleeding  [] Abnormal bruising      /69 (Site: Right Upper Arm, Position: Sitting, Cuff Size: Medium Adult)   Pulse 79   Ht 5' 6\" (1.676 m)   Wt 131 lb (59.4 kg)   BMI 21.14 kg/m²       General appearence: Normal. Well groomed.  In no acute distress    Head: Normocephalic, atraumatic  Eyes: Extraocular movements intact, eye lids normal  Lungs: Respirations unlabored, chest wall no deformity  ENT: Normal external ear canals, no sinus tenderness  Heart: Regular rate rhythm  Abdomen: No masses, tenderness  Extremities: No cyanosis or edema, 2+ pulses  Musculoskeletal: Normal range of motion in all joints  Skin: Intact, normal skin color    Neurological examination:    Mental status   Alert and oriented; intact memory with no confusion, speech or language problems; no hallucinations or delusions     Cranial nerves   II - visual fields intact to confrontation                                                III, IV, VI - extra-ocular muscles full: no pupillary defect; no GILL, no nystagmus, no ptosis   V - normal facial sensation                                                               VII - normal facial symmetry                                                             VIII - intact hearing                                                                             IX, X - symmetrical palate                                                                  XI - symmetrical shoulder shrug                                                       XII - midline tongue without atrophy or fasciculation     Motor function  Normal muscle bulk and tone; normal power 5/5, including fine motor movements     Sensory function Intact to touch, pin, vibration, proprioception     Cerebellar Intact fine motor movement. No involuntary movements or tremors     Reflex function Intact 2+ DTR and symmetric.  Negative Babinski     Gait                  Normal station and gait       Lab Results   Component Value Date    LDLCHOLESTEROL 115 10/07/2021     No components found for: CHLPL  Lab Results   Component Value Date    TRIG 63 10/07/2021    TRIG 74 12/17/2020    TRIG 81 12/06/2019     Lab Results   Component Value Date    HDL 61 10/07/2021    HDL 68 12/17/2020    HDL 48 12/06/2019     No results found for: LDLCALC  No results found for: LABVLDL  Lab Results Component Value Date    LABA1C 6.5 (H) 08/12/2019     Lab Results   Component Value Date     08/12/2019     No results found for: [id number]   Neurological work up:  CT head  CTA head and neck  MRI brain   2 D echo     All of patient's labs were personally reviewed. All the imaging studies were personally reviewed and discussed with the patient. Assessment Recommendations:  Left thalamic ischemic stroke  Tremors in RUE    Left thalamic ischemic stroke (8/11/19) with residual right-sided sensory impairment; improved sensory loss. Patient is currently stable, will continue current regimen. Lab work ordered today. Tremors in right upper extremity occurring intermittently predominantly during resting state; question early parkinsonism. I offered to order NORMA scan to rule out Parkinson's disease, patient declined at this time but notes a paternal history of PD. All medication side effects were discussed and questions answered. Patient to follow up in 1 year or sooner if symptoms worsen. Jonas Rocha MD I would like to thank you for the consult. Please do not hesitate if you have any questions about the patient care. This note is created with the assistance of a speech-recognition program. While intending to generate a document that actually reflects the content of the visit, the document can still have some errors including those of syntax and sound a- like substitutions which may escape proofreading. In such instances, actual meaning can be extrapolated by contextual derivation. Scribe Attestation:   By signing my name below, I, Luke Dong attest that this documentation has been prepared under the direction and in the presence of Art Moon MD.    Electronically Signed: Luke Dong.  12/16/22. 3:45 PM

## 2022-12-19 ENCOUNTER — HOSPITAL ENCOUNTER (OUTPATIENT)
Age: 77
Setting detail: SPECIMEN
Discharge: HOME | End: 2022-12-19

## 2022-12-19 DIAGNOSIS — I63.81 THALAMIC INFARCTION (HCC): ICD-10-CM

## 2022-12-19 LAB
ALBUMIN SERPL-MCNC: 4.6 G/DL (ref 3.5–5.2)
ALBUMIN/GLOBULIN RATIO: 2.1 (ref 1–2.5)
ALP BLD-CCNC: 98 U/L (ref 40–129)
ALT SERPL-CCNC: 21 U/L (ref 5–41)
ANION GAP SERPL CALCULATED.3IONS-SCNC: 10 MMOL/L (ref 9–17)
AST SERPL-CCNC: 22 U/L
BILIRUB SERPL-MCNC: 1 MG/DL (ref 0.3–1.2)
BUN BLDV-MCNC: 26 MG/DL (ref 8–23)
CALCIUM SERPL-MCNC: 9.4 MG/DL (ref 8.6–10.4)
CHLORIDE BLD-SCNC: 101 MMOL/L (ref 98–107)
CHOLESTEROL/HDL RATIO: 3.5
CHOLESTEROL: 215 MG/DL
CO2: 28 MMOL/L (ref 20–31)
CREAT SERPL-MCNC: 0.95 MG/DL (ref 0.7–1.2)
ESTIMATED AVERAGE GLUCOSE: 123 MG/DL
GFR SERPL CREATININE-BSD FRML MDRD: >60 ML/MIN/1.73M2
GLUCOSE BLD-MCNC: 122 MG/DL (ref 70–99)
HBA1C MFR BLD: 5.9 % (ref 4–6)
HCT VFR BLD CALC: 46.3 % (ref 40.7–50.3)
HDLC SERPL-MCNC: 61 MG/DL
HEMOGLOBIN: 15.9 G/DL (ref 13–17)
LDL CHOLESTEROL: 139 MG/DL (ref 0–130)
MCH RBC QN AUTO: 30.4 PG (ref 25.2–33.5)
MCHC RBC AUTO-ENTMCNC: 34.3 G/DL (ref 28.4–34.8)
MCV RBC AUTO: 88.5 FL (ref 82.6–102.9)
NRBC AUTOMATED: 0 PER 100 WBC
PDW BLD-RTO: 12.4 % (ref 11.8–14.4)
PLATELET # BLD: 166 K/UL (ref 138–453)
PMV BLD AUTO: 8.9 FL (ref 8.1–13.5)
POTASSIUM SERPL-SCNC: 4.1 MMOL/L (ref 3.7–5.3)
RBC # BLD: 5.23 M/UL (ref 4.21–5.77)
SODIUM BLD-SCNC: 139 MMOL/L (ref 135–144)
TOTAL PROTEIN: 6.8 G/DL (ref 6.4–8.3)
TRIGL SERPL-MCNC: 77 MG/DL
WBC # BLD: 5.2 K/UL (ref 3.5–11.3)

## 2023-04-20 NOTE — H&P
[address]  Neurology IN-PATIENT SERVICE  [facility]    HISTORY AND PHYSICAL EXAMINATION            Date:   8/11/2019  Patient name:  Oj Garcia  Date of admission:  8/11/2019 11:58 AM  MRN:   [id number]  YOB: 1945    CHIEF COMPLAINT     Chief Complaint   Patient presents with    Numbness     Right side of body is numb. Some extremity weakness as well. Last known well was 1130am today     History Obtained From:  Patient and chart review. HISTORY OF PRESENT ILLNESS     The patient is a 69 y.o.  male who presented with sudden onset right facial tingling and numbness that progressed quickly down to his right arm and to his right leg, associated with some right-sided numbness and clumsiness. Happened at [phone] when he was pushing his boat, also felt like he was slurring his words. He called his wife who drove him to the emergency room in [facility]. Denies falling, headache, nausea vomiting, dizziness, vertigo, loss of consciousness, chest pain, palpitation. No prior history of stroke. Not on any medications. In ED, CT head and CTA were unremarkable, stroke alert was called, TPA was withheld due to improving symptoms, aspirin Plavix were loaded. Patient was transferred to [facility] for further work-up  In the ED and [facility], MRI done, showing left thalamic lacunar stroke. Patient is admitted to the hospital for acute cerebrovascular accident. PAST MEDICAL HISTORY      has no past medical history on file. PAST SURGICAL HISTORY      has a past surgical history that includes Cholecystectomy and hernia repair. HOME MEDICATIONS     Prior to Admission medications    Not on File       ALLERGIES     Pcn [penicillins] and Sulfa antibiotics    SOCIAL HISTORY      reports that he has never smoked. He does not have any smokeless tobacco history on file. He reports that he does not drink alcohol or use drugs.      FAMILY HISTORY     family history includes Cancer in his father; High Blood Pressure in his mother; Stroke in his mother. REVIEW OF SYSTEMS     Review of Systems   Constitutional: Negative for chills, fatigue and unexpected weight change. HENT: Negative for congestion, postnasal drip, rhinorrhea, sinus pressure, trouble swallowing and voice change. Eyes: Negative for photophobia, redness and visual disturbance. Respiratory: Negative for shortness of breath, wheezing and stridor. Cardiovascular: Negative for chest pain, palpitations and leg swelling. Gastrointestinal: Negative for constipation and nausea. Genitourinary: Negative for difficulty urinating and flank pain. Musculoskeletal: Negative for arthralgias and back pain. Neurological: Positive for speech difficulty. PHYSICAL EXAM      /77   Pulse 65   Temp 98.2 °F (36.8 °C) (Oral)   Resp 15   Ht 5' 6\" (1.676 m)   Wt 150 lb (68 kg)   SpO2 97%   BMI 24.21 kg/m²      Physical Exam   Constitutional: He appears well-developed and well-nourished. No distress. HENT:   Head: Normocephalic and atraumatic. Right Ear: External ear normal.   Left Ear: External ear normal.   Mouth/Throat: No oropharyngeal exudate. Eyes: Pupils are equal, round, and reactive to light. Conjunctivae and EOM are normal. Right eye exhibits no discharge. Left eye exhibits no discharge. Neck: Normal range of motion. Neck supple. Cardiovascular: Normal rate and regular rhythm. No murmur heard. Pulmonary/Chest: Effort normal and breath sounds normal. No respiratory distress. Abdominal: Soft. He exhibits no distension. Skin: He is not diaphoretic.        DIAGNOSTICS     Laboratory Testing:  Recent Results (from the past 24 hour(s))   Protime-INR    Collection Time: 08/11/19 12:00 PM   Result Value Ref Range    Protime 11.6 9.4 - 12.6 sec    INR 1.1    APTT    Collection Time: 08/11/19 12:00 PM   Result Value Ref Range    PTT 27.1 21.3 - 31.3 sec   CBC Auto Differential yes  Basic Metabolic Panel    Troponin    Magnesium    Telemetry monitoring    Inpatient consult to Stroke Team    EKG 12 Lead    Insert peripheral IV    Saline lock IV    PATIENT STATUS (FROM ED OR OR/PROCEDURAL) Inpatient         DVT prophylaxis: Lovenox 40 mg SC      Consultations:   Consults: IP CONSULT TO STROKE TEAM  PT/OT    Nursing:  Vital signs per unit routine  Continuous Pulse Oximetry     NPO until passed swallow    Neurovascular checks    Above plan discussed with the patient and family in room, who agree to the above plan         This plan will be discussed with the rounding attending: Dr. Srini Peterson.       Debi Garcia MD,   PGY-2 Neurology Resident  8/11/2019 4:51 PM

## 2023-12-13 ENCOUNTER — OFFICE VISIT (OUTPATIENT)
Dept: NEUROLOGY | Age: 78
End: 2023-12-13
Payer: MEDICARE

## 2023-12-13 VITALS
WEIGHT: 132 LBS | HEART RATE: 67 BPM | HEIGHT: 66 IN | BODY MASS INDEX: 21.21 KG/M2 | SYSTOLIC BLOOD PRESSURE: 130 MMHG | DIASTOLIC BLOOD PRESSURE: 84 MMHG

## 2023-12-13 DIAGNOSIS — R25.1 TREMOR OF RIGHT HAND: Primary | ICD-10-CM

## 2023-12-13 DIAGNOSIS — R29.898 COG-WHEEL RIGIDITY: ICD-10-CM

## 2023-12-13 DIAGNOSIS — I63.81 THALAMIC INFARCTION (HCC): ICD-10-CM

## 2023-12-13 DIAGNOSIS — E78.5 HYPERLIPIDEMIA, UNSPECIFIED HYPERLIPIDEMIA TYPE: ICD-10-CM

## 2023-12-13 PROCEDURE — G8427 DOCREV CUR MEDS BY ELIG CLIN: HCPCS | Performed by: PSYCHIATRY & NEUROLOGY

## 2023-12-13 PROCEDURE — G8420 CALC BMI NORM PARAMETERS: HCPCS | Performed by: PSYCHIATRY & NEUROLOGY

## 2023-12-13 PROCEDURE — 1036F TOBACCO NON-USER: CPT | Performed by: PSYCHIATRY & NEUROLOGY

## 2023-12-13 PROCEDURE — 1123F ACP DISCUSS/DSCN MKR DOCD: CPT | Performed by: PSYCHIATRY & NEUROLOGY

## 2023-12-13 PROCEDURE — 99214 OFFICE O/P EST MOD 30 MIN: CPT | Performed by: PSYCHIATRY & NEUROLOGY

## 2023-12-13 PROCEDURE — G8484 FLU IMMUNIZE NO ADMIN: HCPCS | Performed by: PSYCHIATRY & NEUROLOGY

## 2023-12-13 NOTE — PROGRESS NOTES
Unremarkable. MRI brain 8/11/2019: Acute lacunar ischemic infarct in left thalamus. CBC:     Lab Results   Component Value Date    WBC 5.2 12/19/2022    HGB 15.9 12/19/2022     12/19/2022      BMP:     Lab Results   Component Value Date     12/19/2022    K 4.1 12/19/2022     12/19/2022    CO2 28 12/19/2022    BUN 26 (H) 12/19/2022    CREATININE 0.95 12/19/2022    GLUCOSE 122 (H) 12/19/2022    CALCIUM 9.4 12/19/2022    MG 2.2 08/11/2019       No results found for: \"PHENYTOIN\", \"PHENOBARB\", \"VALPROATE\", \"CBMZ\"    Lab Results   Component Value Date    CHOL 215 (H) 12/19/2022    LDLCHOLESTEROL 139 (H) 12/19/2022    HDL 61 12/19/2022    TRIG 77 12/19/2022    ALT 21 12/19/2022    AST 22 12/19/2022    INR 1.1 08/11/2019    LABA1C 5.9 12/19/2022       Lab Results   Component Value Date    CHOL 215 (H) 12/19/2022    CHOL 189 10/07/2021    CHOL 204 (H) 12/17/2020     Lab Results   Component Value Date    TRIG 77 12/19/2022    TRIG 63 10/07/2021    TRIG 74 12/17/2020     Lab Results   Component Value Date    HDL 61 12/19/2022    HDL 61 10/07/2021    HDL 68 12/17/2020     Lab Results   Component Value Date    LDLCHOLESTEROL 139 (H) 12/19/2022    LDLCHOLESTEROL 115 10/07/2021    LDLCHOLESTEROL 121 12/17/2020     Lab Results   Component Value Date    VLDL NOT REPORTED 10/07/2021    VLDL NOT REPORTED 12/17/2020    VLDL NOT REPORTED 12/06/2019     Lab Results   Component Value Date    CHOLHDLRATIO 3.5 12/19/2022    CHOLHDLRATIO 3.1 10/07/2021    CHOLHDLRATIO 3.0 12/17/2020                 Impression and Plan: Mr. Omid Darling is a 70 y.o. male with   Left thalamic stroke (08/2019) with residual rt sided sensory impairment; stable on aspirin and niacin combination; will get lipid panel to follow-up. Statin intolerance; tried Simvastatin, Atorvastatin and Pravastatin; couldn't tolerate any of those.    Discussion/counseling done about evolocumab 140 mg sq q2wk; he wants to try lifestyle changes such as

## 2023-12-13 NOTE — PATIENT INSTRUCTIONS
[facility]  [place], [place] [id number] [place] [address] health-related material is provided for information purposes only and does not necessarily represent endorsement by or an official position of the [facility] or any other Federal agency. Advice on the treatment or care of an individual patient should be obtained through consultation with a physician who has examined that patient or is familiar with that patient's medical history.

## 2023-12-14 ENCOUNTER — HOSPITAL ENCOUNTER (OUTPATIENT)
Age: 78
Setting detail: SPECIMEN
Discharge: HOME | End: 2023-12-14

## 2023-12-14 DIAGNOSIS — I63.81 THALAMIC INFARCTION (HCC): ICD-10-CM

## 2023-12-14 DIAGNOSIS — E78.5 HYPERLIPIDEMIA, UNSPECIFIED HYPERLIPIDEMIA TYPE: ICD-10-CM

## 2023-12-14 LAB
CHOLEST SERPL-MCNC: 201 MG/DL (ref 0–199)
CHOLESTEROL/HDL RATIO: 3
HDLC SERPL-MCNC: 64 MG/DL
LDLC SERPL CALC-MCNC: 122 MG/DL (ref 0–100)
TRIGL SERPL-MCNC: 72 MG/DL (ref 0–149)
VLDLC SERPL CALC-MCNC: 14 MG/DL

## 2024-03-05 ENCOUNTER — TELEPHONE (OUTPATIENT)
Dept: NEUROLOGY | Age: 79
End: 2024-03-05

## 2024-03-05 NOTE — TELEPHONE ENCOUNTER
Pt called in wondering if he should have a repeat Lipid panel done prior to his follow up on 3/20/24? His last LDL level was 122.

## 2024-03-06 NOTE — TELEPHONE ENCOUNTER
Please let the patient know that it is too soon to show any difference in lab from <3 months lab. So no need now.   Thank you.   -dr. amin

## 2024-03-20 ENCOUNTER — OFFICE VISIT (OUTPATIENT)
Dept: NEUROLOGY | Age: 79
End: 2024-03-20
Payer: MEDICARE

## 2024-03-20 VITALS
HEART RATE: 71 BPM | WEIGHT: 135 LBS | SYSTOLIC BLOOD PRESSURE: 128 MMHG | BODY MASS INDEX: 21.69 KG/M2 | DIASTOLIC BLOOD PRESSURE: 75 MMHG | HEIGHT: 66 IN

## 2024-03-20 DIAGNOSIS — E78.5 HYPERLIPIDEMIA, UNSPECIFIED HYPERLIPIDEMIA TYPE: ICD-10-CM

## 2024-03-20 DIAGNOSIS — R29.898 COG-WHEEL RIGIDITY: ICD-10-CM

## 2024-03-20 DIAGNOSIS — I63.81 THALAMIC INFARCTION (HCC): ICD-10-CM

## 2024-03-20 DIAGNOSIS — Z78.9 STATIN INTOLERANCE: ICD-10-CM

## 2024-03-20 DIAGNOSIS — R25.1 TREMOR OF RIGHT HAND: Primary | ICD-10-CM

## 2024-03-20 DIAGNOSIS — Z86.73 CHRONIC LEFT ARTERIAL ISCHEMIC STROKE, MCA (MIDDLE CEREBRAL ARTERY): ICD-10-CM

## 2024-03-20 PROCEDURE — 1036F TOBACCO NON-USER: CPT | Performed by: PSYCHIATRY & NEUROLOGY

## 2024-03-20 PROCEDURE — 99214 OFFICE O/P EST MOD 30 MIN: CPT | Performed by: PSYCHIATRY & NEUROLOGY

## 2024-03-20 PROCEDURE — G8427 DOCREV CUR MEDS BY ELIG CLIN: HCPCS | Performed by: PSYCHIATRY & NEUROLOGY

## 2024-03-20 PROCEDURE — G8420 CALC BMI NORM PARAMETERS: HCPCS | Performed by: PSYCHIATRY & NEUROLOGY

## 2024-03-20 PROCEDURE — G8484 FLU IMMUNIZE NO ADMIN: HCPCS | Performed by: PSYCHIATRY & NEUROLOGY

## 2024-03-20 PROCEDURE — 1123F ACP DISCUSS/DSCN MKR DOCD: CPT | Performed by: PSYCHIATRY & NEUROLOGY

## 2024-03-20 RX ORDER — EZETIMIBE 10 MG/1
10 TABLET ORAL DAILY
Qty: 90 TABLET | Refills: 1 | Status: SHIPPED | OUTPATIENT
Start: 2024-03-20

## 2024-03-20 NOTE — PROGRESS NOTES
NEUROLOGY FOLLOW-UP  Patient Name:  Emanuel Alberts  :   1945  Clinic Visit Date: 3/20/2024  LOV: 2023         Dear Dr. Perez, Micah AYALA MD       I saw Mr. Emanuel Alberts in follow-up in the office today in continuation of neurologic care.  As you know,  He is a 78 y.o.  Left handed  male with chronic left thalamic stroke (2019).   During recent visit in 2023; he was continued on aspirin and niacin combination; with history of statin intolerance; advised to get lipid panel to follow-up on prior dyslipidemia.  He also has tremors in right upper extremity with cogwheeling rigidity for which he was interested in a trial of Sinemet.  He presented to clinic to follow-up on tremor response to Sinemet stating that he has been feeling much better on it.  He denied any medication related anticipated adverse effects.      2023 visit:  He comes to clinic stating that his numbness in right upper extremity has improved.    He has not had any recurrence of signs/symptoms to indicate TIA/CVA since .    He still has ongoing resting and exertional tremors in right upper extremity and at times get aggravated with stress.  Also has mild left upper extremity tremor as well.  Denies tremors with handwriting.  Denies spillage of food stuff onto the clothes.    He has tried simvastatin, atorvastatin, rosuvastatin and could not tolerate any of those meds.  He has had severe myalgias and could not tolerate those.  He has been on niacin with some improvement in his lipid panel.    Re: prior hospitalization:  Briefly, this is a  74 y.o. Left handed  male with no significant PMH and never on any meds was admitted on 2019 with c/o acute onset of right facial numbness and tingling progressing to right upper extremity and right lower extremity associated with clumsiness along with slurred speech since 11:30 AM.  It occurred when he was pushing his boat.  His wife brought him to ED at

## 2024-10-16 ENCOUNTER — TELEPHONE (OUTPATIENT)
Dept: NEUROLOGY | Age: 79
End: 2024-10-16

## 2024-10-16 NOTE — TELEPHONE ENCOUNTER
Patient called in to verify his appointment. He also wanted to know if he was supposed to have labs done prior. Writer advised he does have a lipid panel ordered to be done prior to his visit and this is supposed to be fasting. He was made aware and voiced his understanding. Also gave him time and date of appointment.

## 2024-12-05 ENCOUNTER — HOSPITAL ENCOUNTER (OUTPATIENT)
Age: 79
Setting detail: SPECIMEN
Discharge: HOME | End: 2024-12-05

## 2024-12-05 DIAGNOSIS — E78.5 HYPERLIPIDEMIA, UNSPECIFIED HYPERLIPIDEMIA TYPE: ICD-10-CM

## 2024-12-05 LAB
CHOLEST SERPL-MCNC: 182 MG/DL (ref 0–199)
CHOLESTEROL/HDL RATIO: 3.3
HDLC SERPL-MCNC: 55 MG/DL
LDLC SERPL CALC-MCNC: 107 MG/DL (ref 0–100)
TRIGL SERPL-MCNC: 100 MG/DL
VLDLC SERPL CALC-MCNC: 20 MG/DL (ref 1–30)

## 2024-12-11 ENCOUNTER — OFFICE VISIT (OUTPATIENT)
Dept: NEUROLOGY | Age: 79
End: 2024-12-11
Payer: MEDICARE

## 2024-12-11 VITALS
HEART RATE: 73 BPM | WEIGHT: 137.6 LBS | BODY MASS INDEX: 22.11 KG/M2 | SYSTOLIC BLOOD PRESSURE: 141 MMHG | HEIGHT: 66 IN | DIASTOLIC BLOOD PRESSURE: 83 MMHG

## 2024-12-11 DIAGNOSIS — E78.5 HYPERLIPIDEMIA, UNSPECIFIED HYPERLIPIDEMIA TYPE: ICD-10-CM

## 2024-12-11 DIAGNOSIS — R25.1 TREMOR OF RIGHT HAND: ICD-10-CM

## 2024-12-11 DIAGNOSIS — Z78.9 STATIN INTOLERANCE: ICD-10-CM

## 2024-12-11 DIAGNOSIS — E78.5 DYSLIPIDEMIA: ICD-10-CM

## 2024-12-11 DIAGNOSIS — I63.81 THALAMIC INFARCTION (HCC): ICD-10-CM

## 2024-12-11 DIAGNOSIS — Z86.73 CHRONIC LEFT ARTERIAL ISCHEMIC STROKE, MCA (MIDDLE CEREBRAL ARTERY): Primary | ICD-10-CM

## 2024-12-11 PROCEDURE — 1123F ACP DISCUSS/DSCN MKR DOCD: CPT | Performed by: PSYCHIATRY & NEUROLOGY

## 2024-12-11 PROCEDURE — 1160F RVW MEDS BY RX/DR IN RCRD: CPT | Performed by: PSYCHIATRY & NEUROLOGY

## 2024-12-11 PROCEDURE — G8420 CALC BMI NORM PARAMETERS: HCPCS | Performed by: PSYCHIATRY & NEUROLOGY

## 2024-12-11 PROCEDURE — G8484 FLU IMMUNIZE NO ADMIN: HCPCS | Performed by: PSYCHIATRY & NEUROLOGY

## 2024-12-11 PROCEDURE — 99214 OFFICE O/P EST MOD 30 MIN: CPT | Performed by: PSYCHIATRY & NEUROLOGY

## 2024-12-11 PROCEDURE — G8427 DOCREV CUR MEDS BY ELIG CLIN: HCPCS | Performed by: PSYCHIATRY & NEUROLOGY

## 2024-12-11 PROCEDURE — 1159F MED LIST DOCD IN RCRD: CPT | Performed by: PSYCHIATRY & NEUROLOGY

## 2024-12-11 PROCEDURE — 1036F TOBACCO NON-USER: CPT | Performed by: PSYCHIATRY & NEUROLOGY

## 2024-12-11 RX ORDER — EZETIMIBE 10 MG/1
10 TABLET ORAL DAILY
Qty: 90 TABLET | Refills: 1 | Status: SHIPPED | OUTPATIENT
Start: 2024-12-11

## 2024-12-11 NOTE — PROGRESS NOTES
NEUROLOGY FOLLOW-UP  Patient Name:  Emanuel Alberts  :   1945  Clinic Visit Date: 2024  LOV: 3/20/2024          Dear Dr. Perez, Micah AYALA MD       I saw . Emanuel Alberts in follow-up in the office today in continuation of neurologic care.  As you know,  He is a 79 y.o.  Left handed  male with chronic left thalamic stroke (2019) with residual minimal right-sided numbness.  He has been on aspirin and niacin combination and he was not on statins because of \"statin allergy\".  He was advised to start taking Zetia but he wanted a repeat lipid panel done prior to initiating Zetia.  Patient had repeat lipid panel and comes to clinic for follow-up visit stating that he has not had any recurrence of symptoms to indicate TIA or CVA.  He has been on a regimen of Zetia 10 mg daily since 2024, following an inability to tolerate statins due to associated pain. He has also implemented dietary modifications, specifically avoiding greasy foods. He is inquiring about other alternatives including use of Metamucil to help lowering his LDL cholesterol.       2024 visit:  During recent visit in 2023; he was continued on aspirin and niacin combination; with history of statin intolerance; advised to get lipid panel to follow-up on prior dyslipidemia.  He also has tremors in right upper extremity with cogwheeling rigidity for which he was interested in a trial of Sinemet.  He presented to clinic to follow-up on tremor response to Sinemet stating that he has been feeling much better on it.  He denied any medication related anticipated adverse effects.      2023 visit:  He comes to clinic stating that his numbness in right upper extremity has improved.    He has not had any recurrence of signs/symptoms to indicate TIA/CVA since 2019.    He still has ongoing resting and exertional tremors in right upper extremity and at times get aggravated with stress.  Also has mild left upper

## 2025-04-09 ENCOUNTER — OFFICE VISIT (OUTPATIENT)
Dept: NEUROLOGY | Age: 80
End: 2025-04-09
Payer: MEDICARE

## 2025-04-09 VITALS
HEART RATE: 65 BPM | BODY MASS INDEX: 21.5 KG/M2 | DIASTOLIC BLOOD PRESSURE: 83 MMHG | WEIGHT: 133.8 LBS | SYSTOLIC BLOOD PRESSURE: 140 MMHG | HEIGHT: 66 IN

## 2025-04-09 DIAGNOSIS — I63.81 THALAMIC INFARCTION (HCC): ICD-10-CM

## 2025-04-09 DIAGNOSIS — R25.1 TREMOR OF RIGHT HAND: ICD-10-CM

## 2025-04-09 DIAGNOSIS — E78.5 HYPERLIPIDEMIA, UNSPECIFIED HYPERLIPIDEMIA TYPE: ICD-10-CM

## 2025-04-09 DIAGNOSIS — Z86.73 CHRONIC LEFT ARTERIAL ISCHEMIC STROKE, MCA (MIDDLE CEREBRAL ARTERY): Primary | ICD-10-CM

## 2025-04-09 DIAGNOSIS — R29.898 COG-WHEEL RIGIDITY: ICD-10-CM

## 2025-04-09 DIAGNOSIS — Z78.9 STATIN INTOLERANCE: ICD-10-CM

## 2025-04-09 PROCEDURE — 1160F RVW MEDS BY RX/DR IN RCRD: CPT | Performed by: PSYCHIATRY & NEUROLOGY

## 2025-04-09 PROCEDURE — G8427 DOCREV CUR MEDS BY ELIG CLIN: HCPCS | Performed by: PSYCHIATRY & NEUROLOGY

## 2025-04-09 PROCEDURE — 1123F ACP DISCUSS/DSCN MKR DOCD: CPT | Performed by: PSYCHIATRY & NEUROLOGY

## 2025-04-09 PROCEDURE — G8420 CALC BMI NORM PARAMETERS: HCPCS | Performed by: PSYCHIATRY & NEUROLOGY

## 2025-04-09 PROCEDURE — 1159F MED LIST DOCD IN RCRD: CPT | Performed by: PSYCHIATRY & NEUROLOGY

## 2025-04-09 PROCEDURE — 99214 OFFICE O/P EST MOD 30 MIN: CPT | Performed by: PSYCHIATRY & NEUROLOGY

## 2025-04-09 PROCEDURE — 1036F TOBACCO NON-USER: CPT | Performed by: PSYCHIATRY & NEUROLOGY

## 2025-04-09 RX ORDER — EZETIMIBE 10 MG/1
10 TABLET ORAL DAILY
Qty: 90 TABLET | Refills: 1 | Status: SHIPPED | OUTPATIENT
Start: 2025-04-09

## 2025-04-09 NOTE — PROGRESS NOTES
12/05/2024    TRIG 100 12/05/2024    ALT 21 12/19/2022    AST 22 12/19/2022    INR 1.1 08/11/2019    LABA1C 5.9 12/19/2022     LDL improved from 139 (2023)            Impression and Plan: Mr. Emanuel Alberts is a 79 y.o. male with   Chronic left thalamic stroke (2019) with minimal residual right-sided sensory impairment; to continue aspirin 81 mg daily, Zetia 10 mg nightly; not on statins because of intolerance; does not want Repatha 140 mg sq injn  Statin intolerance with severe cramps in stomach; couldn't tolerate Simvastatin, Atorvastatin and Pravastatin.  Rt UE tremors: resolved  RUE entrapment neuropathy symptomatology: Stable  Follow-up in 6 months.      Total time spent for this encounter: not billed by time  This note was partially created using voice recognition software and is inherently subject to errors including those of syntax and \"sound alike\" substitutions which may escape proofreading.  In such instances, original meaning may be extrapolated by contextual derivation.